# Patient Record
Sex: FEMALE | Race: WHITE | NOT HISPANIC OR LATINO | Employment: FULL TIME | URBAN - METROPOLITAN AREA
[De-identification: names, ages, dates, MRNs, and addresses within clinical notes are randomized per-mention and may not be internally consistent; named-entity substitution may affect disease eponyms.]

---

## 2023-10-29 PROBLEM — N12 PYELONEPHRITIS: Status: ACTIVE | Noted: 2021-06-18

## 2023-10-29 PROBLEM — R58 ECCHYMOSIS: Status: ACTIVE | Noted: 2020-03-27

## 2023-10-29 PROBLEM — F80.81 STUTTER: Status: ACTIVE | Noted: 2019-04-20

## 2023-10-29 PROBLEM — R13.10 DYSPHAGIA: Status: ACTIVE | Noted: 2023-10-29

## 2023-10-29 PROBLEM — R31.9 HEMATURIA: Status: ACTIVE | Noted: 2021-10-07

## 2023-10-29 PROBLEM — R30.0 BURNING WITH URINATION: Status: ACTIVE | Noted: 2021-10-25

## 2023-10-29 PROBLEM — R41.3 MEMORY LOSS: Status: ACTIVE | Noted: 2019-04-22

## 2023-10-29 PROBLEM — L50.9 URTICARIA: Status: ACTIVE | Noted: 2020-06-11

## 2023-10-29 PROBLEM — A41.9 SEPSIS (MULTI): Status: ACTIVE | Noted: 2023-10-29

## 2023-10-29 PROBLEM — R93.89 ABNORMAL MRI: Status: ACTIVE | Noted: 2019-06-07

## 2023-10-29 PROBLEM — Z20.822 EXPOSURE TO COVID-19 VIRUS: Status: ACTIVE | Noted: 2020-11-17

## 2023-10-29 PROBLEM — G56.01 CARPAL TUNNEL SYNDROME OF RIGHT WRIST: Status: ACTIVE | Noted: 2023-10-29

## 2023-10-29 PROBLEM — G89.29 CHRONIC BACK PAIN: Status: ACTIVE | Noted: 2019-06-07

## 2023-10-29 PROBLEM — M32.9 LUPUS (SYSTEMIC LUPUS ERYTHEMATOSUS) (MULTI): Status: ACTIVE | Noted: 2023-10-29

## 2023-10-29 PROBLEM — M54.9 CHRONIC BACK PAIN: Status: ACTIVE | Noted: 2019-06-07

## 2023-10-29 PROBLEM — R90.89 ABNORMAL BRAIN MRI: Status: ACTIVE | Noted: 2023-10-29

## 2023-10-29 PROBLEM — B02.9 HERPES ZOSTER: Status: ACTIVE | Noted: 2019-07-30

## 2023-10-29 PROBLEM — R06.02 SHORTNESS OF BREATH: Status: ACTIVE | Noted: 2020-11-17

## 2023-10-29 PROBLEM — R29.898 RIGHT LEG WEAKNESS: Status: ACTIVE | Noted: 2022-06-20

## 2023-10-29 PROBLEM — G62.9 POLYNEURITIS: Status: ACTIVE | Noted: 2021-06-25

## 2023-10-29 PROBLEM — N13.30 HYDRONEPHROSIS: Status: ACTIVE | Noted: 2021-06-25

## 2023-10-29 PROBLEM — K21.9 ESOPHAGEAL REFLUX: Status: ACTIVE | Noted: 2022-12-09

## 2023-10-29 PROBLEM — F32.A DEPRESSION: Status: ACTIVE | Noted: 2018-10-05

## 2023-10-29 PROBLEM — R31.0 FRANK HEMATURIA: Status: ACTIVE | Noted: 2023-10-29

## 2023-10-29 RX ORDER — HYDROXYCHLOROQUINE SULFATE 200 MG/1
1 TABLET, FILM COATED ORAL 2 TIMES DAILY
COMMUNITY
Start: 2018-10-05

## 2023-10-29 RX ORDER — METHOTREXATE 2.5 MG/1
TABLET ORAL
COMMUNITY
Start: 2019-06-07 | End: 2023-10-30 | Stop reason: WASHOUT

## 2023-10-29 RX ORDER — MAGNESIUM HYDROXIDE 400 MG/5ML
SUSPENSION, ORAL (FINAL DOSE FORM) ORAL
COMMUNITY
Start: 2022-08-15 | End: 2023-10-30 | Stop reason: WASHOUT

## 2023-10-29 RX ORDER — BUPROPION HYDROCHLORIDE 300 MG/1
300 TABLET ORAL DAILY
COMMUNITY
Start: 2022-08-05 | End: 2023-11-09

## 2023-10-29 RX ORDER — IBUPROFEN 100 MG/5ML
SUSPENSION, ORAL (FINAL DOSE FORM) ORAL
COMMUNITY
Start: 2022-08-15 | End: 2023-10-30 | Stop reason: WASHOUT

## 2023-10-29 RX ORDER — GINSENG 100 MG
CAPSULE ORAL
COMMUNITY
Start: 2022-08-15 | End: 2023-10-30 | Stop reason: WASHOUT

## 2023-10-29 RX ORDER — CELECOXIB 200 MG/1
1 CAPSULE ORAL 2 TIMES DAILY
COMMUNITY
Start: 2021-12-22 | End: 2023-10-30 | Stop reason: WASHOUT

## 2023-10-29 RX ORDER — PANTOPRAZOLE SODIUM 40 MG/1
40 TABLET, DELAYED RELEASE ORAL DAILY
COMMUNITY
Start: 2023-03-26 | End: 2023-10-30 | Stop reason: WASHOUT

## 2023-10-29 RX ORDER — MULTIVITAMIN WITH IRON
TABLET ORAL
COMMUNITY
Start: 2022-08-15

## 2023-10-29 RX ORDER — CYCLOBENZAPRINE HCL 10 MG
10 TABLET ORAL 3 TIMES DAILY PRN
COMMUNITY
Start: 2020-06-11 | End: 2023-10-30 | Stop reason: WASHOUT

## 2023-10-29 RX ORDER — NAPROXEN SODIUM 220 MG/1
TABLET, FILM COATED ORAL
COMMUNITY
Start: 2022-08-15

## 2023-10-29 RX ORDER — FOLIC ACID 1 MG/1
1 TABLET ORAL DAILY
COMMUNITY
Start: 2020-06-11 | End: 2023-10-30 | Stop reason: WASHOUT

## 2023-10-29 RX ORDER — ATORVASTATIN CALCIUM 20 MG/1
20 TABLET, FILM COATED ORAL NIGHTLY
COMMUNITY
End: 2023-10-30 | Stop reason: WASHOUT

## 2023-10-29 RX ORDER — IMMUNE GLOBULIN INFUSION (HUMAN) 100 MG/ML
INJECTION, SOLUTION INTRAVENOUS; SUBCUTANEOUS
COMMUNITY
Start: 2022-08-15 | End: 2023-10-30 | Stop reason: WASHOUT

## 2023-10-29 RX ORDER — ESOMEPRAZOLE MAGNESIUM 40 MG/1
1 CAPSULE, DELAYED RELEASE ORAL DAILY
COMMUNITY
Start: 2021-12-22 | End: 2023-10-30 | Stop reason: WASHOUT

## 2023-10-29 RX ORDER — BUPROPION HYDROCHLORIDE 150 MG/1
150 TABLET, EXTENDED RELEASE ORAL DAILY
COMMUNITY
End: 2023-10-30 | Stop reason: WASHOUT

## 2023-10-29 RX ORDER — ALBUTEROL SULFATE 90 UG/1
2 AEROSOL, METERED RESPIRATORY (INHALATION) 4 TIMES DAILY PRN
COMMUNITY
Start: 2020-11-17 | End: 2023-10-30 | Stop reason: WASHOUT

## 2023-10-30 ENCOUNTER — OFFICE VISIT (OUTPATIENT)
Dept: ENDOCRINOLOGY | Facility: CLINIC | Age: 58
End: 2023-10-30
Payer: COMMERCIAL

## 2023-10-30 VITALS
WEIGHT: 204 LBS | DIASTOLIC BLOOD PRESSURE: 79 MMHG | SYSTOLIC BLOOD PRESSURE: 155 MMHG | HEART RATE: 87 BPM | BODY MASS INDEX: 35.02 KG/M2

## 2023-10-30 DIAGNOSIS — E21.3 HYPERPARATHYROIDISM, UNSPECIFIED (MULTI): Primary | ICD-10-CM

## 2023-10-30 PROCEDURE — 99204 OFFICE O/P NEW MOD 45 MIN: CPT | Performed by: INTERNAL MEDICINE

## 2023-10-30 RX ORDER — VIT C/E/ZN/COPPR/LUTEIN/ZEAXAN 250MG-90MG
50 CAPSULE ORAL DAILY
COMMUNITY

## 2023-10-30 ASSESSMENT — ENCOUNTER SYMPTOMS
MYALGIAS: 1
TROUBLE SWALLOWING: 1
NUMBNESS: 1
DIARRHEA: 0
CONSTIPATION: 0
NAUSEA: 0
SHORTNESS OF BREATH: 0
NECK PAIN: 1
VOMITING: 0
UNEXPECTED WEIGHT CHANGE: 0
FATIGUE: 1
ARTHRALGIAS: 1
SORE THROAT: 1
ENDOCRINE COMMENTS: AS ABOVE
WEAKNESS: 1
NERVOUS/ANXIOUS: 1
ABDOMINAL PAIN: 0
DIFFICULTY URINATING: 0

## 2023-10-30 NOTE — LETTER
2023     Aliza Gibson PA-C  [address] [place] [address] [facility]  [address]  [place] [address]    Patient: Eugenie Jung   YOB: 1965   Date of Visit: 10/30/2023       Dear Dr. Aliza Gibson PA-C:    Thank you for referring Eugenie Jung to me for evaluation. Below are my notes for this consultation.  If you have questions, please do not hesitate to call me. I look forward to following your patient along with you.       Sincerely,     Jayce Villatoro MD      CC: Elodia Herrera MD  ______________________________________________________________________________________    History Of Present Illness  Eugenie Jung is a 58 y.o. female with hyperparathyroidism    PTH    No history of hypercalcemia    Vitamin D supplement 2000 international units  per day    History of fractures in her 20s, left distal ulna, left ankle    DEXA scan done 2017  No history of osteoporosis  No history of kidney stone    Treated for SLE  Stopped Simponi infusions and felt much better    Past Medical History  She has a past medical history of Antiphospholipid antibody syndrome (CMS/HCC), Personal history of diseases of the blood and blood-forming organs and certain disorders involving the immune mechanism, and Systemic lupus erythematosus, unspecified (CMS/HCC) (08/15/2022).    Surgical History  She has a past surgical history that includes Other surgical history (08/15/2022); Other surgical history (08/15/2022); Other surgical history (08/15/2022); Other surgical history (2022); Other surgical history (2022);  section, classic; and Knee surgery.     Social History  She has no history on file for tobacco use, alcohol use, and drug use.    Family History  Family History   Problem Relation Name Age of Onset   • Hyperlipidemia Mother     • Hypertension Mother     • Epilepsy Father     • Dementia Father     • Hypertension Father     • Hyperlipidemia Father     • Hodgkin's  lymphoma Sister     • Multiple sclerosis Father's Sister     • Breast cancer Father's Sister     • Leukemia Father's Sister     • Diabetes Maternal Grandmother     • Other (absence seizure) Son         Allergies  Latex, natural rubber; Morphine; Cortisone; and Sulfamethoxazole    Review of Systems   Constitutional:  Positive for fatigue. Negative for unexpected weight change.   HENT:  Positive for sore throat and trouble swallowing.    Eyes:  Negative for visual disturbance.   Respiratory:  Negative for shortness of breath.    Cardiovascular:  Positive for chest pain.   Gastrointestinal:  Negative for abdominal pain, constipation, diarrhea, nausea and vomiting.   Endocrine:        As above   Genitourinary:  Negative for difficulty urinating.   Musculoskeletal:  Positive for arthralgias, myalgias and neck pain.   Neurological:  Positive for weakness and numbness.   Psychiatric/Behavioral:  The patient is nervous/anxious.         Memory loss         Last Recorded Vitals  There were no vitals taken for this visit.    Physical Exam  Constitutional:       General: She is not in acute distress.  HENT:      Head: Normocephalic.   Eyes:      Extraocular Movements: Extraocular movements intact.   Neck:      Thyroid: No thyromegaly.   Cardiovascular:      Pulses:           Radial pulses are 2+ on the right side and 2+ on the left side.   Musculoskeletal:      Right lower leg: No edema.      Left lower leg: No edema.   Lymphadenopathy:      Cervical: No cervical adenopathy.   Neurological:      Mental Status: She is alert.      Motor: No tremor.   Psychiatric:         Mood and Affect: Affect normal.          Relevant Results    DEXA (9/9/17)  BMD of lumbar spine is in osteopenic range with T-score of -1.4.  BMD of the total hip is in osteopenic range with T score of -1.6.  BMD of the femoral neck is in osteopenic range with T-score of -1.2 .     IMPRESSION  HYPERPARATHYROIDISM, UNSPECIFIED  History of PTH elevation   No  history of hypercalcemia  No history of kidney stones  History of osteopenia  Discussed appropriate PTH elevation in the setting of hypocalcemia, vitamin D deficiency or renal insufficiency  Discussed primary hyperparathyroidism if hypercalemic.  Advised primary hyperparathyroidism is most commonly due to a benign tumor of one of four glands, less commonly due to multi-gland hyperplasia.       RECOMMENDATIONS  Draw labs  Call/message if you have not received results in 1 week    Follow up to be determined,

## 2023-10-30 NOTE — PATIENT INSTRUCTIONS
RECOMMENDATIONS  Draw labs  Call/message if you have not received results in 1 week    Follow up to be determined.

## 2023-10-30 NOTE — PROGRESS NOTES
History Of Present Illness  Eugenie Jung is a 58 y.o. female with hyperparathyroidism    PTH    No history of hypercalcemia    Vitamin D supplement 2000 international units  per day    History of fractures in her 20s, left distal ulna, left ankle    DEXA scan done 2017  No history of osteoporosis  No history of kidney stone    Treated for SLE  Stopped Simponi infusions and felt much better    Past Medical History  She has a past medical history of Antiphospholipid antibody syndrome (CMS/HCC), Personal history of diseases of the blood and blood-forming organs and certain disorders involving the immune mechanism, and Systemic lupus erythematosus, unspecified (CMS/HCC) (08/15/2022).    Surgical History  She has a past surgical history that includes Other surgical history (08/15/2022); Other surgical history (08/15/2022); Other surgical history (08/15/2022); Other surgical history (2022); Other surgical history (2022);  section, classic; and Knee surgery.     Social History  She has no history on file for tobacco use, alcohol use, and drug use.    Family History  Family History   Problem Relation Name Age of Onset    Hyperlipidemia Mother      Hypertension Mother      Epilepsy Father      Dementia Father      Hypertension Father      Hyperlipidemia Father      Hodgkin's lymphoma Sister      Multiple sclerosis Father's Sister      Breast cancer Father's Sister      Leukemia Father's Sister      Diabetes Maternal Grandmother      Other (absence seizure) Son         Allergies  Latex, natural rubber; Morphine; Cortisone; and Sulfamethoxazole    Review of Systems   Constitutional:  Positive for fatigue. Negative for unexpected weight change.   HENT:  Positive for sore throat and trouble swallowing.    Eyes:  Negative for visual disturbance.   Respiratory:  Negative for shortness of breath.    Cardiovascular:  Positive for chest pain.   Gastrointestinal:  Negative for abdominal pain,  constipation, diarrhea, nausea and vomiting.   Endocrine:        As above   Genitourinary:  Negative for difficulty urinating.   Musculoskeletal:  Positive for arthralgias, myalgias and neck pain.   Neurological:  Positive for weakness and numbness.   Psychiatric/Behavioral:  The patient is nervous/anxious.         Memory loss         Last Recorded Vitals  There were no vitals taken for this visit.    Physical Exam  Constitutional:       General: She is not in acute distress.  HENT:      Head: Normocephalic.   Eyes:      Extraocular Movements: Extraocular movements intact.   Neck:      Thyroid: No thyromegaly.   Cardiovascular:      Pulses:           Radial pulses are 2+ on the right side and 2+ on the left side.   Musculoskeletal:      Right lower leg: No edema.      Left lower leg: No edema.   Lymphadenopathy:      Cervical: No cervical adenopathy.   Neurological:      Mental Status: She is alert.      Motor: No tremor.   Psychiatric:         Mood and Affect: Affect normal.          Relevant Results    DEXA (9/9/17)  BMD of lumbar spine is in osteopenic range with T-score of -1.4.  BMD of the total hip is in osteopenic range with T score of -1.6.  BMD of the femoral neck is in osteopenic range with T-score of -1.2 .     IMPRESSION  HYPERPARATHYROIDISM, UNSPECIFIED  History of PTH elevation   No history of hypercalcemia  No history of kidney stones  History of osteopenia  Discussed appropriate PTH elevation in the setting of hypocalcemia, vitamin D deficiency or renal insufficiency  Discussed primary hyperparathyroidism if hypercalemic.  Advised primary hyperparathyroidism is most commonly due to a benign tumor of one of four glands, less commonly due to multi-gland hyperplasia.       RECOMMENDATIONS  Draw labs  Call/message if you have not received results in 1 week    Follow up to be determined,

## 2023-11-03 ENCOUNTER — LAB (OUTPATIENT)
Dept: LAB | Facility: LAB | Age: 58
End: 2023-11-03
Payer: COMMERCIAL

## 2023-11-03 DIAGNOSIS — E21.3 HYPERPARATHYROIDISM, UNSPECIFIED (MULTI): ICD-10-CM

## 2023-11-03 LAB
25(OH)D3 SERPL-MCNC: 35 NG/ML (ref 30–100)
ALBUMIN SERPL BCP-MCNC: 4.2 G/DL (ref 3.4–5)
ALP SERPL-CCNC: 159 U/L (ref 33–110)
ALT SERPL W P-5'-P-CCNC: 31 U/L (ref 7–45)
ANION GAP SERPL CALC-SCNC: 13 MMOL/L (ref 10–20)
AST SERPL W P-5'-P-CCNC: 31 U/L (ref 9–39)
BILIRUB SERPL-MCNC: 0.3 MG/DL (ref 0–1.2)
BUN SERPL-MCNC: 19 MG/DL (ref 6–23)
CA-I BLD-SCNC: 1.21 MMOL/L (ref 1.1–1.33)
CALCIUM SERPL-MCNC: 9.9 MG/DL (ref 8.6–10.6)
CHLORIDE SERPL-SCNC: 104 MMOL/L (ref 98–107)
CO2 SERPL-SCNC: 29 MMOL/L (ref 21–32)
CREAT SERPL-MCNC: 1.03 MG/DL (ref 0.5–1.05)
GFR SERPL CREATININE-BSD FRML MDRD: 64 ML/MIN/1.73M*2
GLUCOSE SERPL-MCNC: 79 MG/DL (ref 74–99)
MAGNESIUM SERPL-MCNC: 2.42 MG/DL (ref 1.6–2.4)
PHOSPHATE SERPL-MCNC: 4.6 MG/DL (ref 2.5–4.9)
POTASSIUM SERPL-SCNC: 4.5 MMOL/L (ref 3.5–5.3)
PROT SERPL-MCNC: 7.8 G/DL (ref 6.4–8.2)
PTH-INTACT SERPL-MCNC: 133.2 PG/ML (ref 18.5–88)
SODIUM SERPL-SCNC: 141 MMOL/L (ref 136–145)

## 2023-11-03 PROCEDURE — 80053 COMPREHEN METABOLIC PANEL: CPT

## 2023-11-03 PROCEDURE — 83735 ASSAY OF MAGNESIUM: CPT

## 2023-11-03 PROCEDURE — 84100 ASSAY OF PHOSPHORUS: CPT

## 2023-11-03 PROCEDURE — 83970 ASSAY OF PARATHORMONE: CPT

## 2023-11-03 PROCEDURE — 82330 ASSAY OF CALCIUM: CPT

## 2023-11-03 PROCEDURE — 82306 VITAMIN D 25 HYDROXY: CPT

## 2023-11-03 PROCEDURE — 36415 COLL VENOUS BLD VENIPUNCTURE: CPT

## 2024-01-08 ENCOUNTER — APPOINTMENT (OUTPATIENT)
Dept: RADIOLOGY | Facility: HOSPITAL | Age: 59
End: 2024-01-08
Payer: COMMERCIAL

## 2024-01-08 ENCOUNTER — APPOINTMENT (OUTPATIENT)
Dept: CARDIOLOGY | Facility: HOSPITAL | Age: 59
End: 2024-01-08
Payer: COMMERCIAL

## 2024-01-08 ENCOUNTER — HOSPITAL ENCOUNTER (EMERGENCY)
Facility: HOSPITAL | Age: 59
Discharge: HOME | End: 2024-01-09
Attending: EMERGENCY MEDICINE
Payer: COMMERCIAL

## 2024-01-08 VITALS
SYSTOLIC BLOOD PRESSURE: 129 MMHG | DIASTOLIC BLOOD PRESSURE: 66 MMHG | OXYGEN SATURATION: 96 % | HEART RATE: 134 BPM | HEIGHT: 65 IN | TEMPERATURE: 102.9 F | BODY MASS INDEX: 30.82 KG/M2 | RESPIRATION RATE: 18 BRPM | WEIGHT: 185 LBS

## 2024-01-08 DIAGNOSIS — B34.9 VIRAL SYNDROME: ICD-10-CM

## 2024-01-08 DIAGNOSIS — U07.1 COVID-19: Primary | ICD-10-CM

## 2024-01-08 LAB
BASOPHILS # BLD AUTO: 0.06 X10*3/UL (ref 0–0.1)
BASOPHILS NFR BLD AUTO: 0.6 %
EOSINOPHIL # BLD AUTO: 0.03 X10*3/UL (ref 0–0.7)
EOSINOPHIL NFR BLD AUTO: 0.3 %
ERYTHROCYTE [DISTWIDTH] IN BLOOD BY AUTOMATED COUNT: 13.2 % (ref 11.5–14.5)
FLUAV RNA RESP QL NAA+PROBE: NOT DETECTED
FLUBV RNA RESP QL NAA+PROBE: NOT DETECTED
HCT VFR BLD AUTO: 43 % (ref 36–46)
HGB BLD-MCNC: 14.4 G/DL (ref 12–16)
IMM GRANULOCYTES # BLD AUTO: 0.03 X10*3/UL (ref 0–0.7)
IMM GRANULOCYTES NFR BLD AUTO: 0.3 % (ref 0–0.9)
LYMPHOCYTES # BLD AUTO: 1.02 X10*3/UL (ref 1.2–4.8)
LYMPHOCYTES NFR BLD AUTO: 10.8 %
MCH RBC QN AUTO: 29.9 PG (ref 26–34)
MCHC RBC AUTO-ENTMCNC: 33.5 G/DL (ref 32–36)
MCV RBC AUTO: 89 FL (ref 80–100)
MONOCYTES # BLD AUTO: 0.7 X10*3/UL (ref 0.1–1)
MONOCYTES NFR BLD AUTO: 7.4 %
NEUTROPHILS # BLD AUTO: 7.57 X10*3/UL (ref 1.2–7.7)
NEUTROPHILS NFR BLD AUTO: 80.6 %
NRBC BLD-RTO: 0 /100 WBCS (ref 0–0)
NT-PROBNP SERPL-MCNC: 109 PG/ML (ref 0–226)
PLATELET # BLD AUTO: 275 X10*3/UL (ref 150–450)
RBC # BLD AUTO: 4.82 X10*6/UL (ref 4–5.2)
RSV RNA RESP QL NAA+PROBE: NOT DETECTED
SARS-COV-2 RNA RESP QL NAA+PROBE: DETECTED
WBC # BLD AUTO: 9.4 X10*3/UL (ref 4.4–11.3)

## 2024-01-08 PROCEDURE — 84484 ASSAY OF TROPONIN QUANT: CPT | Performed by: PHYSICIAN ASSISTANT

## 2024-01-08 PROCEDURE — 87637 SARSCOV2&INF A&B&RSV AMP PRB: CPT | Performed by: PHYSICIAN ASSISTANT

## 2024-01-08 PROCEDURE — 36415 COLL VENOUS BLD VENIPUNCTURE: CPT | Performed by: PHYSICIAN ASSISTANT

## 2024-01-08 PROCEDURE — 99284 EMERGENCY DEPT VISIT MOD MDM: CPT | Performed by: EMERGENCY MEDICINE

## 2024-01-08 PROCEDURE — 85025 COMPLETE CBC W/AUTO DIFF WBC: CPT | Performed by: PHYSICIAN ASSISTANT

## 2024-01-08 PROCEDURE — 2500000004 HC RX 250 GENERAL PHARMACY W/ HCPCS (ALT 636 FOR OP/ED): Performed by: NURSE PRACTITIONER

## 2024-01-08 PROCEDURE — 93005 ELECTROCARDIOGRAM TRACING: CPT

## 2024-01-08 PROCEDURE — 80053 COMPREHEN METABOLIC PANEL: CPT | Performed by: PHYSICIAN ASSISTANT

## 2024-01-08 PROCEDURE — 83880 ASSAY OF NATRIURETIC PEPTIDE: CPT | Performed by: PHYSICIAN ASSISTANT

## 2024-01-08 PROCEDURE — 96361 HYDRATE IV INFUSION ADD-ON: CPT

## 2024-01-08 PROCEDURE — 71046 X-RAY EXAM CHEST 2 VIEWS: CPT

## 2024-01-08 RX ORDER — ACETAMINOPHEN 325 MG/1
975 TABLET ORAL ONCE
Status: COMPLETED | OUTPATIENT
Start: 2024-01-08 | End: 2024-01-08

## 2024-01-08 RX ADMIN — SODIUM CHLORIDE 1000 ML: 9 INJECTION, SOLUTION INTRAVENOUS at 17:25

## 2024-01-08 RX ADMIN — ACETAMINOPHEN 975 MG: 325 TABLET ORAL at 16:10

## 2024-01-08 ASSESSMENT — PAIN - FUNCTIONAL ASSESSMENT: PAIN_FUNCTIONAL_ASSESSMENT: 0-10

## 2024-01-08 ASSESSMENT — PAIN DESCRIPTION - PROGRESSION: CLINICAL_PROGRESSION: NOT CHANGED

## 2024-01-08 ASSESSMENT — COLUMBIA-SUICIDE SEVERITY RATING SCALE - C-SSRS
1. IN THE PAST MONTH, HAVE YOU WISHED YOU WERE DEAD OR WISHED YOU COULD GO TO SLEEP AND NOT WAKE UP?: NO
2. HAVE YOU ACTUALLY HAD ANY THOUGHTS OF KILLING YOURSELF?: NO

## 2024-01-08 ASSESSMENT — PAIN DESCRIPTION - FREQUENCY: FREQUENCY: INTERMITTENT

## 2024-01-08 ASSESSMENT — PAIN DESCRIPTION - DESCRIPTORS: DESCRIPTORS: TIGHTNESS

## 2024-01-08 ASSESSMENT — PAIN SCALES - GENERAL: PAINLEVEL_OUTOF10: 7

## 2024-01-08 ASSESSMENT — PAIN DESCRIPTION - ORIENTATION: ORIENTATION: RIGHT

## 2024-01-08 ASSESSMENT — PAIN DESCRIPTION - LOCATION: LOCATION: CHEST

## 2024-01-08 ASSESSMENT — PAIN DESCRIPTION - PAIN TYPE: TYPE: ACUTE PAIN

## 2024-01-08 ASSESSMENT — PAIN DESCRIPTION - ONSET: ONSET: SUDDEN

## 2024-01-08 NOTE — ED PROVIDER NOTES
HPI   Chief Complaint   Patient presents with    Fever     I feel sob and chest tightness with a fever and weak and a throbbing h/a       HPI  Please see my MDM                  [place] Coma Scale Score: 15                  Patient History   Past Medical History:   Diagnosis Date    Antiphospholipid antibody syndrome (CMS/HCC)     Personal history of diseases of the blood and blood-forming organs and certain disorders involving the immune mechanism     History of autoimmune disorder    Systemic lupus erythematosus, unspecified (CMS/HCC) 08/15/2022    Lupus     Past Surgical History:   Procedure Laterality Date     SECTION, CLASSIC      KNEE SURGERY      OTHER SURGICAL HISTORY  08/15/2022    Hysterectomy    OTHER SURGICAL HISTORY  08/15/2022    Carpal tunnel surgery    OTHER SURGICAL HISTORY  08/15/2022    Shoulder surgery    OTHER SURGICAL HISTORY  2022    Appendectomy    OTHER SURGICAL HISTORY  2022    Cholecystectomy     Family History   Problem Relation Name Age of Onset    Hyperlipidemia Mother      Hypertension Mother      Epilepsy Father      Dementia Father      Hypertension Father      Hyperlipidemia Father      Hodgkin's lymphoma Sister      Multiple sclerosis Father's Sister      Breast cancer Father's Sister      Leukemia Father's Sister      Diabetes Maternal Grandmother      Other (absence seizure) Son       Social History     Tobacco Use    Smoking status: Not on file    Smokeless tobacco: Not on file   Substance Use Topics    Alcohol use: Not on file    Drug use: Not on file       Physical Exam   ED Triage Vitals [24 1614]   Temp Heart Rate Resp BP   39.4 °C (102.9 °F) (!) 134 18 129/66      SpO2 Temp Source Heart Rate Source Patient Position   96 % Oral Monitor Sitting      BP Location FiO2 (%)     Left arm --       Physical Exam  CONSTITUTIONAL: Vital signs reviewed as charted, well-developed and in no distress  Eyes: Extraocular muscles are intact. Pupils equal round and  reactive to light. Conjunctiva are pink.    ENT: Mucous membranes are moist. Tongue in the midline. Pharynx was without erythema or exudates, uvula midline  LUNGS: Breath sounds equal and clear to auscultation. Good air exchange, no wheezes rales or retractions, pulse oximetry is charted.  HEART: Regular rate and rhythm without murmur thrill or rub, strong tones, auscultation is normal.  ABDOMEN: Soft and nontender without guarding rebound rigidity or mass. Bowel sounds are present and normal in all quadrants. There is no palpable masses or aneurysms identified. No hepatosplenomegaly, normal abdominal exam.  Neuro: The patient is awake, alert and oriented ×3. Moving all 4 extremities and answering questions appropriately.   MUSCULOSKELETAL: The calves are nontender to palpation. Full gross active range of motion.   PSYCH: Awake alert oriented, normal mood and affect.  Skin:  Dry, normal color, warm to the touch, no rash present.      ED Course & MDM   ED Course as of 01/11/24 0856   Mon Jan 08, 2024   1623 EKG: Normal sinus rhythm, tachycardia.  Normal QRS.  Nonspecific ST abnormalities.  No STEMI.  Interpreted by me. [FR]   Tue Jan 09, 2024   0034 Care transferred to Dr. Perez pending reassessment  [RJ]   0133 I did speak with the patient, and she is aware of the fact that she does have coronavirus and she has received IV fluids.  Patient will be given Toradol to help with her pain.  I do feel the patient can be safely discharged home and I will prescribe medications to help with her symptoms.  Patient will be started on Paxlovid as well. [FR]      ED Course User Index  [FR] Peter Perez DO  [RJ] TORSTEN Vazquez-CNP         Diagnoses as of 01/11/24 0856   COVID-19   Viral syndrome       Medical Decision Making  History obtained from: patient    Vital signs, nursing notes, current medications, past medical history, Surgical history, allergies, social history, family History were reviewed.          HPI:  Patient is a 58-year-old female presents complaining of several days of chest discomfort and exertional shortness of breath.  She also has had a cough and generally not feeling well.  She denies any breathing problems, denies cardiac history.  Denies dizziness, or abdominal pain or lower extremity edema.  She is known to be febrile and tachycardic on arrival.      10 point ROS was reviewed and negative except Noted above in HPI.  DDX: as listed above    Chest x-ray interpreted by the radiologist showed:  Impression:    No acute cardiopulmonary process.        Medications administered during this visit (name and route): IV normal saline, oral acetaminophen  EKG interpretted by my attending physician    [facility] Summary/considerations:  I estimate there is LOW risk for EPIGLOTTITIS, PNEUMONIA, MENINGITIS, OR URINARY TRACT INFECTION, thus I consider the discharge disposition reasonable. Also, there is no evidence for peritonitis, sepsis, or toxicity. We have discussed the diagnosis and risks, and we agree with discharging home to follow-up with their primary doctor. We also discussed returning to the Emergency Department immediately if new or worsening symptoms occur. We have discussed the symptoms which are most concerning (e.g., changing or worsening pain, trouble swallowing or breathing, neck stiffness, fever) that necessitate immediate return.    Patient was positive for COVID-19, was given IV fluids, symptomatic control and feeling better.  Tachycardia had resolved heart rate at time of discharge was 89.  Patient was discharged home in stable condition will follow PCP 1 to 2 days for reevaluation.        All of the patient's questions were answered to the best of my ability.  Patient states understanding that they have been screened for an emergency today and we have not found any etiology of symptoms that requires emergent treatment or admission to the hospital at this point. They understand that they  have not had definitive care day and require follow-up for treatment of their condition. They also state understanding that they may have an emergent condition that may potentially have not of detected at this visit and they must return to the emergency department if they develop any worsening of symptoms or new complaints.      Critical Care: Not warranted at this time    Prescriptions provided include: ###    This chart was completed using voice recognition transcription software. Please excuse any errors of transcription including grammatical, punctuation, syntax and spelling errors.  Please contact me with any questions regarding this chart.    Procedure  Procedures     TORSTEN Vazquez-CHRISTINE  01/11/24 0856

## 2024-01-09 LAB
ALBUMIN SERPL-MCNC: 3.7 G/DL (ref 3.5–5)
ALP BLD-CCNC: 115 U/L (ref 35–125)
ALT SERPL-CCNC: 26 U/L (ref 5–40)
ANION GAP SERPL CALC-SCNC: 11 MMOL/L
AST SERPL-CCNC: 22 U/L (ref 5–40)
BILIRUB SERPL-MCNC: 0.2 MG/DL (ref 0.1–1.2)
BUN SERPL-MCNC: 10 MG/DL (ref 8–25)
CALCIUM SERPL-MCNC: 8.7 MG/DL (ref 8.5–10.4)
CHLORIDE SERPL-SCNC: 105 MMOL/L (ref 97–107)
CO2 SERPL-SCNC: 20 MMOL/L (ref 24–31)
CREAT SERPL-MCNC: 0.9 MG/DL (ref 0.4–1.6)
EGFRCR SERPLBLD CKD-EPI 2021: 75 ML/MIN/1.73M*2
GLUCOSE SERPL-MCNC: 117 MG/DL (ref 65–99)
POTASSIUM SERPL-SCNC: 3.9 MMOL/L (ref 3.4–5.1)
PROT SERPL-MCNC: 7 G/DL (ref 5.9–7.9)
SODIUM SERPL-SCNC: 136 MMOL/L (ref 133–145)
TROPONIN T SERPL-MCNC: 9 NG/L
TROPONIN T SERPL-MCNC: 9 NG/L

## 2024-01-09 PROCEDURE — 96374 THER/PROPH/DIAG INJ IV PUSH: CPT

## 2024-01-09 PROCEDURE — 2500000004 HC RX 250 GENERAL PHARMACY W/ HCPCS (ALT 636 FOR OP/ED): Performed by: EMERGENCY MEDICINE

## 2024-01-09 RX ORDER — ONDANSETRON 4 MG/1
4 TABLET, ORALLY DISINTEGRATING ORAL EVERY 8 HOURS PRN
Qty: 20 TABLET | Refills: 0 | Status: SHIPPED | OUTPATIENT
Start: 2024-01-09 | End: 2024-01-16

## 2024-01-09 RX ORDER — KETOROLAC TROMETHAMINE 30 MG/ML
15 INJECTION, SOLUTION INTRAMUSCULAR; INTRAVENOUS ONCE
Status: COMPLETED | OUTPATIENT
Start: 2024-01-09 | End: 2024-01-09

## 2024-01-09 RX ORDER — IBUPROFEN 600 MG/1
600 TABLET ORAL EVERY 6 HOURS PRN
Qty: 20 TABLET | Refills: 0 | Status: SHIPPED | OUTPATIENT
Start: 2024-01-09 | End: 2024-01-16

## 2024-01-09 RX ORDER — BROMPHENIRAMINE MALEATE, PSEUDOEPHEDRINE HYDROCHLORIDE, AND DEXTROMETHORPHAN HYDROBROMIDE 2; 30; 10 MG/5ML; MG/5ML; MG/5ML
10 SYRUP ORAL 4 TIMES DAILY PRN
Qty: 250 ML | Refills: 0 | Status: SHIPPED | OUTPATIENT
Start: 2024-01-09 | End: 2024-01-19

## 2024-01-09 RX ADMIN — KETOROLAC TROMETHAMINE 15 MG: 30 INJECTION INTRAMUSCULAR; INTRAVENOUS at 01:56

## 2024-01-09 ASSESSMENT — PAIN SCALES - GENERAL: PAINLEVEL_OUTOF10: 7

## 2024-01-09 NOTE — ED PROVIDER NOTES
HPI   Chief Complaint   Patient presents with    Fever     I feel sob and chest tightness with a fever and weak and a throbbing h/a       Patient was turned over to me awaiting results of patient's blood work.  The patient was seen here for viral type syndrome, and the patient also felt as though she was dehydrated.  Please refer to the midlevel's initial note.  The patient does have history of having lupus.                          [place] Coma Scale Score: 15                  Patient History   Past Medical History:   Diagnosis Date    Antiphospholipid antibody syndrome (CMS/[facility])     Personal history of diseases of the blood and blood-forming organs and certain disorders involving the immune mechanism     History of autoimmune disorder    Systemic lupus erythematosus, unspecified (CMS/HCC) 08/15/2022    Lupus     Past Surgical History:   Procedure Laterality Date     SECTION, CLASSIC      KNEE SURGERY      OTHER SURGICAL HISTORY  08/15/2022    Hysterectomy    OTHER SURGICAL HISTORY  08/15/2022    Carpal tunnel surgery    OTHER SURGICAL HISTORY  08/15/2022    Shoulder surgery    OTHER SURGICAL HISTORY  2022    Appendectomy    OTHER SURGICAL HISTORY  2022    Cholecystectomy     Family History   Problem Relation Name Age of Onset    Hyperlipidemia Mother      Hypertension Mother      Epilepsy Father      Dementia Father      Hypertension Father      Hyperlipidemia Father      Hodgkin's lymphoma Sister      Multiple sclerosis Father's Sister      Breast cancer Father's Sister      Leukemia Father's Sister      Diabetes Maternal Grandmother      Other (absence seizure) Son       Social History     Tobacco Use    Smoking status: Not on file    Smokeless tobacco: Not on file   Substance Use Topics    Alcohol use: Not on file    Drug use: Not on file       Physical Exam   ED Triage Vitals [24 1614]   Temp Heart Rate Resp BP   39.4 °C (102.9 °F) (!) 134 18 129/66      SpO2 Temp Source Heart Rate  Source Patient Position   96 % Oral Monitor Sitting      BP Location FiO2 (%)     Left arm --       Physical Exam  Vitals and nursing note reviewed.   Constitutional:       General: She is not in acute distress.     Appearance: She is well-developed.   HENT:      Head: Normocephalic and atraumatic.   Eyes:      Conjunctiva/sclera: Conjunctivae normal.   Cardiovascular:      Rate and Rhythm: Normal rate and regular rhythm.      Heart sounds: No murmur heard.  Pulmonary:      Effort: Pulmonary effort is normal. No respiratory distress.      Breath sounds: Normal breath sounds.   Abdominal:      Palpations: Abdomen is soft.      Tenderness: There is no abdominal tenderness.   Musculoskeletal:         General: No swelling.      Cervical back: Neck supple.   Skin:     General: Skin is warm and dry.      Capillary Refill: Capillary refill takes less than 2 seconds.   Neurological:      Mental Status: She is alert.         ED Course & MDM   ED Course as of 01/09/24 0142   Mon Jan 08, 2024   1623 EKG: Normal sinus rhythm, tachycardia.  Normal QRS.  Nonspecific ST abnormalities.  No STEMI.  Interpreted by me. [FR]   Tue Jan 09, 2024   0034 Care transferred to Dr. Perez pending reassessment  [RJ]   0133 I did speak with the patient, and she is aware of the fact that she does have coronavirus and she has received IV fluids.  Patient will be given Toradol to help with her pain.  I do feel the patient can be safely discharged home and I will prescribe medications to help with her symptoms.  Patient will be started on Paxlovid as well. [FR]      ED Course User Index  [FR] Peter Perez DO  [RJ] TORSTEN Vazquez-CNP         Diagnoses as of 01/09/24 0142   COVID-19   Viral syndrome       Medical Decision Making  After our initial evaluation, the patient did have a full workup done including EKG, and blood work which included cardiac enzymes, and also viral studies were sent as well.  Viral studies were noted to be  positive for coronavirus, and I will reevaluate the patient after receiving IV fluids.        Procedure  Procedures     Peter Perez DO  01/09/24 0142

## 2024-01-12 LAB
ATRIAL RATE: 129 BPM
P AXIS: 46 DEGREES
P OFFSET: 193 MS
P ONSET: 143 MS
PR INTERVAL: 150 MS
Q ONSET: 218 MS
QRS COUNT: 21 BEATS
QRS DURATION: 78 MS
QT INTERVAL: 304 MS
QTC CALCULATION(BAZETT): 445 MS
QTC FREDERICIA: 392 MS
R AXIS: -32 DEGREES
T AXIS: 21 DEGREES
T OFFSET: 370 MS
VENTRICULAR RATE: 129 BPM

## 2024-01-16 ENCOUNTER — TELEMEDICINE (OUTPATIENT)
Dept: PRIMARY CARE | Facility: CLINIC | Age: 59
End: 2024-01-16
Payer: COMMERCIAL

## 2024-01-16 VITALS — WEIGHT: 185 LBS | OXYGEN SATURATION: 98 % | TEMPERATURE: 98.7 F | BODY MASS INDEX: 30.79 KG/M2 | HEART RATE: 99 BPM

## 2024-01-16 DIAGNOSIS — M32.8 OTHER FORMS OF SYSTEMIC LUPUS ERYTHEMATOSUS, UNSPECIFIED ORGAN INVOLVEMENT STATUS (MULTI): ICD-10-CM

## 2024-01-16 DIAGNOSIS — E21.3 HYPERPARATHYROIDISM, UNSPECIFIED (MULTI): ICD-10-CM

## 2024-01-16 DIAGNOSIS — U07.1 COVID: Primary | ICD-10-CM

## 2024-01-16 PROCEDURE — 99213 OFFICE O/P EST LOW 20 MIN: CPT | Performed by: PHYSICIAN ASSISTANT

## 2024-01-16 RX ORDER — ALBUTEROL SULFATE 90 UG/1
2 AEROSOL, METERED RESPIRATORY (INHALATION) EVERY 6 HOURS PRN
Qty: 8.5 G | Refills: 1 | Status: SHIPPED | OUTPATIENT
Start: 2024-01-16 | End: 2025-01-15

## 2024-01-16 ASSESSMENT — ENCOUNTER SYMPTOMS
SYNCOPE: 0
SPUTUM PRODUCTION: 1
LEG PAIN: 0
FEVER: 0
PND: 0
RHINORRHEA: 1
VOMITING: 0
SORE THROAT: 1
ORTHOPNEA: 1
ABDOMINAL PAIN: 0
SWOLLEN GLANDS: 0
WHEEZING: 0
NECK PAIN: 0
HEADACHES: 1
HEMOPTYSIS: 0
CLAUDICATION: 0
SHORTNESS OF BREATH: 1

## 2024-01-16 ASSESSMENT — PAIN SCALES - GENERAL: PAINLEVEL: 6

## 2024-01-16 NOTE — PROGRESS NOTES
Answers submitted by the patient for this visit:  Shortness of Breath Questionnaire (Submitted on 1/16/2024)  Chief Complaint: Shortness of breath  Chronicity: recurrent  Onset: in the past 7 days  Frequency: every few minutes  Progression since onset: waxing and waning  Episode duration: 3 Minutes  abdominal pain: No  chest pain: Yes  claudication: No  coryza: Yes  ear pain: No  fever: No  headaches: Yes  hemoptysis: No  leg pain: No  leg swelling: No  neck pain: No  orthopnea: Yes  PND: No  rash: No  rhinorrhea: Yes  sore throat: Yes  sputum production: Yes  swollen glands: No  syncope: No  vomiting: No  wheezing: No  Aggravating factors: any activity

## 2024-01-16 NOTE — PROGRESS NOTES
Subjective   Patient ID: Eugenie Jung is a 58 y.o. female who presents for URI (Tested positive for COVID on 1/8 in ED, given course of paxlovid. She is still having cough and congestion, fatigue, shortness of breath on exertion. Afebrile as of 1/11. Her home O2 has been between 92-96%.).    Shortness of Breath  This is a recurrent problem. The current episode started in the past 7 days. The problem occurs every few minutes. The problem has been waxing and waning. The average episode lasts 3 minutes. Associated symptoms include chest pain, coryza, headaches, orthopnea, rhinorrhea, a sore throat and sputum production. Pertinent negatives include no abdominal pain, claudication, ear pain, fever, hemoptysis, leg pain, leg swelling, neck pain, PND, rash, swollen glands, syncope, vomiting or wheezing. The symptoms are aggravated by any activity.        Review of Systems   Constitutional:  Negative for fever.   HENT:  Positive for rhinorrhea and sore throat. Negative for ear pain.    Respiratory:  Positive for sputum production and shortness of breath. Negative for hemoptysis and wheezing.    Cardiovascular:  Positive for chest pain and orthopnea. Negative for claudication, leg swelling, syncope and PND.   Gastrointestinal:  Negative for abdominal pain and vomiting.   Musculoskeletal:  Negative for neck pain.   Skin:  Negative for rash.   Neurological:  Positive for headaches.       Objective   Pulse 99   Temp 37.1 °C (98.7 °F)   Wt 83.9 kg (185 lb)   SpO2 98%   BMI 30.79 kg/m²     Physical Exam  Constitutional:       General: She is not in acute distress.     Appearance: Normal appearance.   Neurological:      Mental Status: She is alert. Mental status is at baseline.   Psychiatric:         Mood and Affect: Mood normal.         Judgment: Judgment normal.         Assessment/Plan   Diagnoses and all orders for this visit:  COVID  -     albuterol (ProAir HFA) 90 mcg/actuation inhaler; Inhale 2 puffs every 6 hours  if needed for wheezing or shortness of breath.  Hyperparathyroidism, unspecified (CMS/HCC)  Other forms of systemic lupus erythematosus, unspecified organ involvement status (CMS/HCC)    Discussed quarantine time as well.  Patient should follow-up if symptoms persist or worsen.

## 2024-01-16 NOTE — LETTER
Date: 2024  RE:  Eugenie Jung  :  1965      To Whom It May Concern:    Eugenie, has been under my care and now may return back to work without restrictions.    Their return to work date is:  2024.        Sincerely,      Aliza Gbison PA-C

## 2024-02-18 DIAGNOSIS — F32.A DEPRESSION, UNSPECIFIED DEPRESSION TYPE: ICD-10-CM

## 2024-02-18 NOTE — LETTER
February 26, 2024     Eugenie Jung  [address]  [place]  [place] [address]    Patient: Eugenie M Fabiana   YOB: 1965   Date of Visit: 2/18/2024       Dear Eugenie Jung:    We have been trying to reach you to schedule your yearly physical. Please call the office to schedule.        Sincerely,     Aliza Gibson PA-C        ______________________________________________________________________________________

## 2024-02-20 RX ORDER — BUPROPION HYDROCHLORIDE 300 MG/1
TABLET ORAL
Qty: 90 TABLET | Refills: 0 | Status: SHIPPED | OUTPATIENT
Start: 2024-02-20 | End: 2024-03-22 | Stop reason: SDUPTHER

## 2024-03-21 ASSESSMENT — PROMIS GLOBAL HEALTH SCALE
CARRYOUT_PHYSICAL_ACTIVITIES: MODERATELY
RATE_AVERAGE_PAIN: 7
RATE_PHYSICAL_HEALTH: FAIR
RATE_GENERAL_HEALTH: FAIR
RATE_MENTAL_HEALTH: POOR
RATE_AVERAGE_FATIGUE: SEVERE
RATE_QUALITY_OF_LIFE: FAIR
CARRYOUT_SOCIAL_ACTIVITIES: FAIR
RATE_SOCIAL_SATISFACTION: FAIR
EMOTIONAL_PROBLEMS: SOMETIMES

## 2024-03-22 ENCOUNTER — LAB (OUTPATIENT)
Dept: LAB | Facility: LAB | Age: 59
End: 2024-03-22
Payer: COMMERCIAL

## 2024-03-22 ENCOUNTER — OFFICE VISIT (OUTPATIENT)
Dept: PRIMARY CARE | Facility: CLINIC | Age: 59
End: 2024-03-22
Payer: COMMERCIAL

## 2024-03-22 VITALS
DIASTOLIC BLOOD PRESSURE: 96 MMHG | HEART RATE: 81 BPM | WEIGHT: 196 LBS | OXYGEN SATURATION: 97 % | SYSTOLIC BLOOD PRESSURE: 126 MMHG | BODY MASS INDEX: 32.62 KG/M2

## 2024-03-22 DIAGNOSIS — I35.8 AORTIC VALVE SCLEROSIS: ICD-10-CM

## 2024-03-22 DIAGNOSIS — E21.3 HYPERPARATHYROIDISM, UNSPECIFIED (MULTI): ICD-10-CM

## 2024-03-22 DIAGNOSIS — M32.8 OTHER FORMS OF SYSTEMIC LUPUS ERYTHEMATOSUS, UNSPECIFIED ORGAN INVOLVEMENT STATUS (MULTI): ICD-10-CM

## 2024-03-22 DIAGNOSIS — F41.9 ANXIETY: ICD-10-CM

## 2024-03-22 DIAGNOSIS — Z00.00 ROUTINE MEDICAL EXAM: ICD-10-CM

## 2024-03-22 DIAGNOSIS — Z12.11 ENCOUNTER FOR SCREENING FOR MALIGNANT NEOPLASM OF COLON: Primary | ICD-10-CM

## 2024-03-22 DIAGNOSIS — K21.9 GASTROESOPHAGEAL REFLUX DISEASE WITHOUT ESOPHAGITIS: ICD-10-CM

## 2024-03-22 DIAGNOSIS — M85.89 OSTEOPENIA OF MULTIPLE SITES: ICD-10-CM

## 2024-03-22 DIAGNOSIS — F32.A DEPRESSION, UNSPECIFIED DEPRESSION TYPE: ICD-10-CM

## 2024-03-22 DIAGNOSIS — Z12.31 VISIT FOR SCREENING MAMMOGRAM: ICD-10-CM

## 2024-03-22 PROBLEM — L93.0 LUPUS ERYTHEMATOSUS: Status: ACTIVE | Noted: 2023-10-29

## 2024-03-22 PROBLEM — M54.50 LOW BACK PAIN: Status: ACTIVE | Noted: 2019-06-07

## 2024-03-22 LAB
ALBUMIN SERPL-MCNC: 4.5 G/DL (ref 3.5–5)
ALP BLD-CCNC: 118 U/L (ref 35–125)
ALT SERPL-CCNC: 31 U/L (ref 5–40)
ANION GAP SERPL CALC-SCNC: 15 MMOL/L
APPEARANCE UR: CLEAR
AST SERPL-CCNC: 28 U/L (ref 5–40)
BASOPHILS # BLD AUTO: 0.08 X10*3/UL (ref 0–0.1)
BASOPHILS NFR BLD AUTO: 1.4 %
BILIRUB SERPL-MCNC: 0.2 MG/DL (ref 0.1–1.2)
BILIRUB UR STRIP.AUTO-MCNC: NEGATIVE MG/DL
BUN SERPL-MCNC: 14 MG/DL (ref 8–25)
CALCIUM SERPL-MCNC: 9.7 MG/DL (ref 8.5–10.4)
CHLORIDE SERPL-SCNC: 106 MMOL/L (ref 97–107)
CHOLEST SERPL-MCNC: 176 MG/DL (ref 133–200)
CHOLEST/HDLC SERPL: 2.4 {RATIO}
CO2 SERPL-SCNC: 22 MMOL/L (ref 24–31)
COLOR UR: YELLOW
CREAT SERPL-MCNC: 1 MG/DL (ref 0.4–1.6)
EGFRCR SERPLBLD CKD-EPI 2021: 66 ML/MIN/1.73M*2
EOSINOPHIL # BLD AUTO: 0.17 X10*3/UL (ref 0–0.7)
EOSINOPHIL NFR BLD AUTO: 3 %
ERYTHROCYTE [DISTWIDTH] IN BLOOD BY AUTOMATED COUNT: 13.9 % (ref 11.5–14.5)
EST. AVERAGE GLUCOSE BLD GHB EST-MCNC: 117 MG/DL
GLUCOSE SERPL-MCNC: 92 MG/DL (ref 65–99)
GLUCOSE UR STRIP.AUTO-MCNC: NORMAL MG/DL
HBA1C MFR BLD: 5.7 %
HCT VFR BLD AUTO: 43 % (ref 36–46)
HDLC SERPL-MCNC: 72 MG/DL
HGB BLD-MCNC: 13.8 G/DL (ref 12–16)
HYALINE CASTS #/AREA URNS AUTO: ABNORMAL /LPF
IMM GRANULOCYTES # BLD AUTO: 0.01 X10*3/UL (ref 0–0.7)
IMM GRANULOCYTES NFR BLD AUTO: 0.2 % (ref 0–0.9)
KETONES UR STRIP.AUTO-MCNC: NEGATIVE MG/DL
LDLC SERPL CALC-MCNC: 92 MG/DL (ref 65–130)
LEUKOCYTE ESTERASE UR QL STRIP.AUTO: NEGATIVE
LYMPHOCYTES # BLD AUTO: 2.07 X10*3/UL (ref 1.2–4.8)
LYMPHOCYTES NFR BLD AUTO: 36.8 %
MCH RBC QN AUTO: 29.7 PG (ref 26–34)
MCHC RBC AUTO-ENTMCNC: 32.1 G/DL (ref 32–36)
MCV RBC AUTO: 93 FL (ref 80–100)
MONOCYTES # BLD AUTO: 0.6 X10*3/UL (ref 0.1–1)
MONOCYTES NFR BLD AUTO: 10.7 %
MUCOUS THREADS #/AREA URNS AUTO: ABNORMAL /LPF
NEUTROPHILS # BLD AUTO: 2.69 X10*3/UL (ref 1.2–7.7)
NEUTROPHILS NFR BLD AUTO: 47.9 %
NITRITE UR QL STRIP.AUTO: NEGATIVE
NRBC BLD-RTO: 0 /100 WBCS (ref 0–0)
PH UR STRIP.AUTO: 5 [PH]
PLATELET # BLD AUTO: 303 X10*3/UL (ref 150–450)
POTASSIUM SERPL-SCNC: 4.3 MMOL/L (ref 3.4–5.1)
PROT SERPL-MCNC: 8 G/DL (ref 5.9–7.9)
PROT UR STRIP.AUTO-MCNC: NORMAL MG/DL
RBC # BLD AUTO: 4.64 X10*6/UL (ref 4–5.2)
RBC # UR STRIP.AUTO: NEGATIVE /UL
RBC #/AREA URNS AUTO: ABNORMAL /HPF
SODIUM SERPL-SCNC: 143 MMOL/L (ref 133–145)
SP GR UR STRIP.AUTO: 1.02
SQUAMOUS #/AREA URNS AUTO: ABNORMAL /HPF
TRIGL SERPL-MCNC: 61 MG/DL (ref 40–150)
TSH SERPL DL<=0.05 MIU/L-ACNC: 1.85 MIU/L (ref 0.27–4.2)
UROBILINOGEN UR STRIP.AUTO-MCNC: NORMAL MG/DL
WBC # BLD AUTO: 5.6 X10*3/UL (ref 4.4–11.3)
WBC #/AREA URNS AUTO: ABNORMAL /HPF

## 2024-03-22 PROCEDURE — 80061 LIPID PANEL: CPT

## 2024-03-22 PROCEDURE — 1036F TOBACCO NON-USER: CPT | Performed by: PHYSICIAN ASSISTANT

## 2024-03-22 PROCEDURE — 36415 COLL VENOUS BLD VENIPUNCTURE: CPT

## 2024-03-22 PROCEDURE — 80053 COMPREHEN METABOLIC PANEL: CPT

## 2024-03-22 PROCEDURE — 99396 PREV VISIT EST AGE 40-64: CPT | Performed by: PHYSICIAN ASSISTANT

## 2024-03-22 PROCEDURE — 81001 URINALYSIS AUTO W/SCOPE: CPT

## 2024-03-22 PROCEDURE — 83036 HEMOGLOBIN GLYCOSYLATED A1C: CPT

## 2024-03-22 PROCEDURE — 84443 ASSAY THYROID STIM HORMONE: CPT

## 2024-03-22 PROCEDURE — 85025 COMPLETE CBC W/AUTO DIFF WBC: CPT

## 2024-03-22 RX ORDER — BUPROPION HYDROCHLORIDE 300 MG/1
TABLET ORAL
Qty: 90 TABLET | Refills: 3 | Status: SHIPPED | OUTPATIENT
Start: 2024-03-22

## 2024-03-22 ASSESSMENT — ENCOUNTER SYMPTOMS
FREQUENCY: 0
BACK PAIN: 1
SHORTNESS OF BREATH: 0
BLOOD IN STOOL: 0
DIARRHEA: 0
NUMBNESS: 1
NAUSEA: 0
THOUGHT CONTENT - OBSESSIONS: 0
DIZZINESS: 0
COLOR CHANGE: 0
ACTIVITY CHANGE: 0
MYALGIAS: 1
DEPRESSED MOOD: 0
NERVOUS/ANXIOUS: 1
ARTHRALGIAS: 1
SLEEP DISTURBANCE: 0
PANIC: 0
WHEEZING: 0
ABDOMINAL PAIN: 0
TREMORS: 0
PALPITATIONS: 0
CONSTIPATION: 0
CHEST TIGHTNESS: 0
LIGHT-HEADEDNESS: 0
APPETITE CHANGE: 0
RESTLESSNESS: 0

## 2024-03-22 ASSESSMENT — PAIN SCALES - GENERAL: PAINLEVEL: 0-NO PAIN

## 2024-03-22 NOTE — PROGRESS NOTES
Subjective   Patient ID: Eugenie Jung is a 58 y.o. female who presents for Annual Exam (Hysterectomy. EKG 2024. Has never had colon cancer screening. ).    Anxiety  Presents for follow-up visit. Symptoms include nervous/anxious behavior. Patient reports no chest pain, depressed mood, dizziness, excessive worry, nausea, obsessions, palpitations, panic, restlessness or shortness of breath. Symptoms occur occasionally. The quality of sleep is fair.        Patient sees rheumatology for her lupus but is only on minimal medication.  She says she does feel better oddly enough.  She has a lot of chronic pain but her occupation also does not help that.    Review of Systems   Constitutional:  Negative for activity change and appetite change.   HENT:  Negative for dental problem.    Eyes:  Negative for visual disturbance.   Respiratory:  Negative for chest tightness, shortness of breath and wheezing.    Cardiovascular:  Negative for chest pain, palpitations and leg swelling.   Gastrointestinal:  Negative for abdominal pain, blood in stool, constipation, diarrhea and nausea.   Endocrine: Negative for cold intolerance and heat intolerance.   Genitourinary:  Negative for frequency and urgency.   Musculoskeletal:  Positive for arthralgias, back pain and myalgias.   Skin:  Negative for color change.   Allergic/Immunologic: Negative for immunocompromised state.   Neurological:  Positive for numbness. Negative for dizziness, tremors and light-headedness.   Psychiatric/Behavioral:  Negative for behavioral problems and sleep disturbance. The patient is nervous/anxious.        Objective   BP (!) 126/96   Pulse 81   Wt 88.9 kg (196 lb)   SpO2 97%   BMI 32.62 kg/m²     Physical Exam  Constitutional:       Appearance: She is obese.   HENT:      Right Ear: Tympanic membrane normal.      Left Ear: Tympanic membrane normal.      Nose: Nose normal.      Mouth/Throat:      Mouth: Mucous membranes are moist.   Cardiovascular:      Rate  and Rhythm: Normal rate and regular rhythm.      Pulses: Normal pulses.      Heart sounds: Murmur heard.   Pulmonary:      Effort: Pulmonary effort is normal. No respiratory distress.   Chest:   Breasts:     Right: No swelling, inverted nipple or mass.      Left: No swelling, inverted nipple or mass.   Abdominal:      General: Bowel sounds are normal.      Tenderness: There is no abdominal tenderness.   Musculoskeletal:      Cervical back: Normal range of motion. No tenderness.      Right lower leg: No edema.      Left lower leg: No edema.   Skin:     General: Skin is warm.   Neurological:      General: No focal deficit present.      Mental Status: She is alert. Mental status is at baseline.   Psychiatric:         Mood and Affect: Mood normal.         Thought Content: Thought content normal.         Judgment: Judgment normal.         Assessment/Plan   Diagnoses and all orders for this visit:  Encounter for screening for malignant neoplasm of colon  -     Cologuard® colon cancer screening; Future  Routine medical exam  -     CBC and Auto Differential; Future  -     Comprehensive Metabolic Panel; Future  -     Hemoglobin A1C; Future  -     Lipid Panel; Future  -     TSH with reflex to Free T4 if abnormal; Future  -     Urinalysis with Reflex Microscopic; Future  Visit for screening mammogram  -     BI mammo bilateral screening tomosynthesis; Future  Anxiety  -     CBC and Auto Differential; Future  -     Comprehensive Metabolic Panel; Future  -     Hemoglobin A1C; Future  -     Lipid Panel; Future  -     TSH with reflex to Free T4 if abnormal; Future  -     buPROPion XL (Wellbutrin XL) 300 mg 24 hr tablet; TAKE ONE TABLET BY MOUTH EVERY 24 HOURS  Other forms of systemic lupus erythematosus, unspecified organ involvement status (CMS/HCC)  Hyperparathyroidism, unspecified (CMS/HCC)  -     Comprehensive Metabolic Panel; Future  Gastroesophageal reflux disease without esophagitis  Aortic valve sclerosis  -      Transthoracic Echo (TTE) Complete; Future  Osteopenia of multiple sites  -     XR DEXA bone density; Future  Depression, unspecified depression type  -     buPROPion XL (Wellbutrin XL) 300 mg 24 hr tablet; TAKE ONE TABLET BY MOUTH EVERY 24 HOURS  Other orders  -     perflutren lipid microspheres (Definity) injection 0.5-10 mL of dilution  -     sulfur hexafluoride microsphr (Lumason) injection 24.28 mg  -     perflutren protein A microsphere (Optison) injection 0.5 mL  -     Follow Up In Primary Care - Established; Future    Patient's last echo was 5 years ago we will repeat that.  She is also due for bone density has osteopenia.  She sees endocrinology as well.  Will recheck her blood pressure in the next month or 2.

## 2024-03-26 ENCOUNTER — HOSPITAL ENCOUNTER (OUTPATIENT)
Dept: RADIOLOGY | Facility: HOSPITAL | Age: 59
Discharge: HOME | End: 2024-03-26
Payer: COMMERCIAL

## 2024-03-26 DIAGNOSIS — M85.89 OSTEOPENIA OF MULTIPLE SITES: ICD-10-CM

## 2024-03-26 PROCEDURE — 77080 DXA BONE DENSITY AXIAL: CPT

## 2024-04-03 LAB — NONINV COLON CA DNA+OCC BLD SCRN STL QL: NEGATIVE

## 2024-04-05 ENCOUNTER — HOSPITAL ENCOUNTER (OUTPATIENT)
Dept: RADIOLOGY | Facility: HOSPITAL | Age: 59
Discharge: HOME | End: 2024-04-05
Payer: COMMERCIAL

## 2024-04-05 VITALS — BODY MASS INDEX: 32.49 KG/M2 | HEIGHT: 65 IN | WEIGHT: 195 LBS

## 2024-04-05 DIAGNOSIS — Z12.31 VISIT FOR SCREENING MAMMOGRAM: ICD-10-CM

## 2024-04-05 PROCEDURE — 77067 SCR MAMMO BI INCL CAD: CPT | Performed by: RADIOLOGY

## 2024-04-05 PROCEDURE — 77067 SCR MAMMO BI INCL CAD: CPT

## 2024-04-05 PROCEDURE — 77063 BREAST TOMOSYNTHESIS BI: CPT | Performed by: RADIOLOGY

## 2024-04-12 ENCOUNTER — HOSPITAL ENCOUNTER (OUTPATIENT)
Dept: CARDIOLOGY | Facility: HOSPITAL | Age: 59
Discharge: HOME | End: 2024-04-12
Payer: COMMERCIAL

## 2024-04-12 DIAGNOSIS — I35.8 AORTIC VALVE SCLEROSIS: ICD-10-CM

## 2024-04-12 LAB
AORTIC VALVE PEAK VELOCITY: 1.29 M/S
AV PEAK GRADIENT: 6.7 MMHG
AVA (PEAK VEL): 2.95 CM2
EJECTION FRACTION APICAL 4 CHAMBER: 60.5
GLOBAL LONGITUDINAL STRAIN: -20.1 %
LEFT ATRIUM VOLUME AREA LENGTH INDEX BSA: 28.3 ML/M2
LEFT VENTRICLE INTERNAL DIMENSION DIASTOLE: 4.05 CM (ref 3.5–6)
LEFT VENTRICULAR OUTFLOW TRACT DIAMETER: 2 CM
LV EJECTION FRACTION BIPLANE: 61 %
MITRAL VALVE E/A RATIO: 0.8
MITRAL VALVE E/E' RATIO: 9.71
RIGHT VENTRICLE FREE WALL PEAK S': 17.1 CM/S
TRICUSPID ANNULAR PLANE SYSTOLIC EXCURSION: 1.8 CM

## 2024-04-12 PROCEDURE — 76376 3D RENDER W/INTRP POSTPROCES: CPT | Performed by: INTERNAL MEDICINE

## 2024-04-12 PROCEDURE — 76376 3D RENDER W/INTRP POSTPROCES: CPT

## 2024-04-12 PROCEDURE — 93306 TTE W/DOPPLER COMPLETE: CPT | Performed by: INTERNAL MEDICINE

## 2024-04-12 PROCEDURE — 93356 MYOCRD STRAIN IMG SPCKL TRCK: CPT | Performed by: INTERNAL MEDICINE

## 2024-05-03 ENCOUNTER — HOSPITAL ENCOUNTER (OUTPATIENT)
Dept: RADIOLOGY | Facility: HOSPITAL | Age: 59
Discharge: HOME | End: 2024-05-03
Payer: COMMERCIAL

## 2024-05-03 DIAGNOSIS — M47.816 SPONDYLOSIS WITHOUT MYELOPATHY OR RADICULOPATHY, LUMBAR REGION: ICD-10-CM

## 2024-05-03 DIAGNOSIS — G89.11 ACUTE PAIN DUE TO TRAUMA: ICD-10-CM

## 2024-05-03 DIAGNOSIS — M54.59 OTHER LOW BACK PAIN: ICD-10-CM

## 2024-05-03 PROCEDURE — 72072 X-RAY EXAM THORAC SPINE 3VWS: CPT | Performed by: RADIOLOGY

## 2024-05-03 PROCEDURE — 72100 X-RAY EXAM L-S SPINE 2/3 VWS: CPT

## 2024-05-03 PROCEDURE — 72072 X-RAY EXAM THORAC SPINE 3VWS: CPT

## 2024-05-03 PROCEDURE — 72100 X-RAY EXAM L-S SPINE 2/3 VWS: CPT | Performed by: RADIOLOGY

## 2024-06-06 ENCOUNTER — OFFICE VISIT (OUTPATIENT)
Dept: PRIMARY CARE | Facility: CLINIC | Age: 59
End: 2024-06-06
Payer: COMMERCIAL

## 2024-06-06 ENCOUNTER — HOSPITAL ENCOUNTER (OUTPATIENT)
Dept: RADIOLOGY | Facility: CLINIC | Age: 59
Discharge: HOME | End: 2024-06-06
Payer: COMMERCIAL

## 2024-06-06 VITALS
SYSTOLIC BLOOD PRESSURE: 138 MMHG | OXYGEN SATURATION: 98 % | WEIGHT: 200 LBS | HEART RATE: 88 BPM | DIASTOLIC BLOOD PRESSURE: 94 MMHG | BODY MASS INDEX: 33.28 KG/M2

## 2024-06-06 DIAGNOSIS — R07.81 RIB PAIN: ICD-10-CM

## 2024-06-06 PROCEDURE — 99213 OFFICE O/P EST LOW 20 MIN: CPT | Performed by: PHYSICIAN ASSISTANT

## 2024-06-06 PROCEDURE — 71101 X-RAY EXAM UNILAT RIBS/CHEST: CPT | Mod: LT

## 2024-06-06 PROCEDURE — 71101 X-RAY EXAM UNILAT RIBS/CHEST: CPT | Mod: LEFT SIDE | Performed by: RADIOLOGY

## 2024-06-06 ASSESSMENT — PAIN SCALES - GENERAL: PAINLEVEL: 7

## 2024-06-06 NOTE — PROGRESS NOTES
"Subjective   Patient ID: Eugenie Jung is a 58 y.o. female who presents for Rib Injury (Injured self on washer this past Sunday while leaning it over it, felt and heard a \"pop\" on left side under breast, pain wraps around side. Taking ibuprofen, using ice and lidocaine patches.).    HPI   Pt has had left rib pain since Sunday. She leaned over the washer machine and her mom was behind her. She leaned on her and then felt the pop in her ribs.  Review of Systems   Respiratory:  Positive for chest tightness. Negative for shortness of breath and wheezing.    Cardiovascular:  Positive for chest pain.   Musculoskeletal:  Positive for arthralgias.       Objective   BP (!) 138/94   Pulse 88   Wt 90.7 kg (200 lb)   SpO2 98%   BMI 33.28 kg/m²     Physical Exam  Eyes:      Pupils: Pupils are equal, round, and reactive to light.   Cardiovascular:      Rate and Rhythm: Normal rate and regular rhythm.   Pulmonary:      Effort: Pulmonary effort is normal.   Chest:      Chest wall: Tenderness present.   Musculoskeletal:      Cervical back: Neck supple.   Neurological:      Mental Status: She is alert.     Pain of left antierior ribs to the mid axillary line. No swelling or bruising seen.    Assessment/Plan   Diagnoses and all orders for this visit:  Rib pain  Will obtain xray to rule out fx. Discussed using heat and taking deep breaths.        "

## 2024-06-09 ASSESSMENT — ENCOUNTER SYMPTOMS
ARTHRALGIAS: 1
CHEST TIGHTNESS: 1
SHORTNESS OF BREATH: 0
WHEEZING: 0

## 2024-06-21 ENCOUNTER — TELEPHONE (OUTPATIENT)
Dept: PRIMARY CARE | Facility: CLINIC | Age: 59
End: 2024-06-21

## 2024-06-21 ENCOUNTER — APPOINTMENT (OUTPATIENT)
Dept: PRIMARY CARE | Facility: CLINIC | Age: 59
End: 2024-06-21
Payer: COMMERCIAL

## 2024-06-21 VITALS
WEIGHT: 200 LBS | DIASTOLIC BLOOD PRESSURE: 82 MMHG | BODY MASS INDEX: 33.28 KG/M2 | HEART RATE: 76 BPM | SYSTOLIC BLOOD PRESSURE: 130 MMHG | OXYGEN SATURATION: 98 %

## 2024-06-21 DIAGNOSIS — R03.0 ELEVATED BLOOD PRESSURE READING: Primary | ICD-10-CM

## 2024-06-21 DIAGNOSIS — F32.A DEPRESSION, UNSPECIFIED DEPRESSION TYPE: ICD-10-CM

## 2024-06-21 DIAGNOSIS — F41.9 ANXIETY: ICD-10-CM

## 2024-06-21 PROCEDURE — 1036F TOBACCO NON-USER: CPT | Performed by: PHYSICIAN ASSISTANT

## 2024-06-21 PROCEDURE — 99213 OFFICE O/P EST LOW 20 MIN: CPT | Performed by: PHYSICIAN ASSISTANT

## 2024-06-21 ASSESSMENT — PAIN SCALES - GENERAL: PAINLEVEL: 0-NO PAIN

## 2024-06-21 ASSESSMENT — ENCOUNTER SYMPTOMS
PALPITATIONS: 0
HYPERTENSION: 1
NERVOUS/ANXIOUS: 1
SHORTNESS OF BREATH: 0
LIGHT-HEADEDNESS: 0
APPETITE CHANGE: 0
ACTIVITY CHANGE: 0
NUMBNESS: 0
DIZZINESS: 0
CHEST TIGHTNESS: 0

## 2024-06-21 NOTE — PROGRESS NOTES
Subjective   Patient ID: Eugenie Jung is a 58 y.o. female who presents for Follow-up (Recheck elevated blood pressure).    Hypertension  This is a recurrent problem. Pertinent negatives include no chest pain, palpitations or shortness of breath. (Rib pain resolved. No more pain) Risk factors for coronary artery disease include obesity and smoking/tobacco exposure.    Has not been watching her salt intake.    Review of Systems   Constitutional:  Negative for activity change and appetite change.   Respiratory:  Negative for chest tightness and shortness of breath.    Cardiovascular:  Negative for chest pain, palpitations and leg swelling.   Neurological:  Negative for dizziness, light-headedness and numbness.   Psychiatric/Behavioral:  Positive for behavioral problems. The patient is nervous/anxious.        Objective   /82   Pulse 76   Wt 90.7 kg (200 lb)   SpO2 98%   BMI 33.28 kg/m²     Physical Exam  Eyes:      Extraocular Movements: Extraocular movements intact.      Pupils: Pupils are equal, round, and reactive to light.   Skin:     General: Skin is warm.   Neurological:      General: No focal deficit present.      Mental Status: She is alert. Mental status is at baseline.   Psychiatric:         Mood and Affect: Mood normal.         Behavior: Behavior normal.         Thought Content: Thought content normal.         Judgment: Judgment normal.         Assessment/Plan   Diagnoses and all orders for this visit:  Elevated blood pressure reading  Anxiety  Depression, unspecified depression type  Other orders  -     Follow Up In Primary Care - Established  -     Follow Up In Primary Care - Established; Future  Will recheck it in 3 months.

## 2024-07-22 ENCOUNTER — APPOINTMENT (OUTPATIENT)
Dept: ENDOCRINOLOGY | Facility: CLINIC | Age: 59
End: 2024-07-22
Payer: COMMERCIAL

## 2024-07-22 VITALS
WEIGHT: 201 LBS | BODY MASS INDEX: 33.45 KG/M2 | HEART RATE: 85 BPM | DIASTOLIC BLOOD PRESSURE: 85 MMHG | SYSTOLIC BLOOD PRESSURE: 137 MMHG

## 2024-07-22 DIAGNOSIS — E21.3 HYPERPARATHYROIDISM, UNSPECIFIED (MULTI): Primary | ICD-10-CM

## 2024-07-22 PROCEDURE — 99214 OFFICE O/P EST MOD 30 MIN: CPT | Performed by: INTERNAL MEDICINE

## 2024-07-22 PROCEDURE — 1036F TOBACCO NON-USER: CPT | Performed by: INTERNAL MEDICINE

## 2024-07-22 RX ORDER — CEPHRADINE 500 MG
CAPSULE ORAL 2 TIMES DAILY
COMMUNITY

## 2024-07-22 ASSESSMENT — ENCOUNTER SYMPTOMS
TROUBLE SWALLOWING: 1
FEVER: 0
ABDOMINAL PAIN: 0
VOMITING: 0
NERVOUS/ANXIOUS: 1
HEADACHES: 0
TREMORS: 0
DIARRHEA: 0
UNEXPECTED WEIGHT CHANGE: 1
SHORTNESS OF BREATH: 0
WEAKNESS: 0
FATIGUE: 1
ARTHRALGIAS: 1
NAUSEA: 0
PALPITATIONS: 0
MYALGIAS: 1
SORE THROAT: 1
DIZZINESS: 1
BACK PAIN: 1
CONSTIPATION: 0
NECK PAIN: 1

## 2024-07-22 NOTE — PROGRESS NOTES
History Of Present Illness  Eugenie Jung is a 58 y.o. female with hyperparathyroidism    Vitamin D3 1000 units with K2, BID  Increased dose 2 months ago and added the K2    History of fractures in her 20s, left distal ulna, left ankle     DEXA scan done 2017  No history of osteoporosis  No history of kidney stone    Treated for SLE  Hydroxychloroquine    Past Medical History  She has a past medical history of Antiphospholipid antibody syndrome (Multi), Anxiety, GERD (gastroesophageal reflux disease) (All the time), Headache (Sporadic), Personal history of diseases of the blood and blood-forming organs and certain disorders involving the immune mechanism, and Systemic lupus erythematosus, unspecified (Multi) (08/15/2022).    Surgical History  She has a past surgical history that includes Other surgical history (08/15/2022); Other surgical history (08/15/2022); Other surgical history (08/15/2022); Other surgical history (2022); Other surgical history (2022);  section, classic; Knee surgery; Appendectomy (); Cholecystectomy ();  section, low transverse (); Hysterectomy (1999); and [place] tooth extraction ().     Social History  She reports that she quit smoking about 6 years ago. Her smoking use included cigarettes. She started smoking about 40 years ago. She has a 17.1 pack-year smoking history. She has never used smokeless tobacco. She reports that she does not currently use alcohol. She reports that she does not use drugs.    Family History  Family History   Problem Relation Name Age of Onset    Hyperlipidemia Mother      Hypertension Mother      Epilepsy Father      Dementia Father      Hypertension Father      Hyperlipidemia Father      Hodgkin's lymphoma Sister      Multiple sclerosis Father's Sister Dianne     Breast cancer Father's Sister Dianne     Leukemia Father's Sister Dianne     Diabetes Maternal Grandmother Betzy raman     Other (absence seizure) Son          Medications  Current Outpatient Medications   Medication Instructions    albuterol (ProAir HFA) 90 mcg/actuation inhaler 2 puffs, inhalation, Every 6 hours PRN    aspirin 81 mg chewable tablet oral    buPROPion XL (Wellbutrin XL) 300 mg 24 hr tablet TAKE ONE TABLET BY MOUTH EVERY 24 HOURS    hydroxychloroquine (Plaquenil) 200 mg tablet 1 tablet, oral, 2 times daily    multivitamin (Multiple Vitamins) tablet oral    vitamin D3-vitamin K2, MK4, (K2 Plus D3) 1,000-100 unit-mcg tablet oral, 2 times daily       Allergies  Latex, natural rubber; Morphine; Prednisone; Cortisone; and Sulfamethoxazole    Review of Systems   Constitutional:  Positive for fatigue and unexpected weight change. Negative for fever.   HENT:  Positive for sore throat and trouble swallowing.    Eyes:  Negative for visual disturbance.   Respiratory:  Negative for shortness of breath.    Cardiovascular:  Negative for chest pain and palpitations.   Gastrointestinal:  Negative for abdominal pain, constipation, diarrhea, nausea and vomiting.   Endocrine: Negative for cold intolerance and heat intolerance.   Musculoskeletal:  Positive for arthralgias, back pain, myalgias and neck pain.   Skin:  Negative for rash.   Neurological:  Positive for dizziness. Negative for tremors, weakness and headaches.   Psychiatric/Behavioral:  The patient is nervous/anxious.          Last Recorded Vitals  Blood pressure 137/85, pulse 85, weight 91.2 kg (201 lb).    Physical Exam  Constitutional:       General: She is not in acute distress.  HENT:      Head: Normocephalic.      Mouth/Throat:      Mouth: Mucous membranes are moist.   Eyes:      Extraocular Movements: Extraocular movements intact.   Neck:      Thyroid: No thyroid mass or thyromegaly.   Cardiovascular:      Pulses:           Radial pulses are 2+ on the right side and 2+ on the left side.   Musculoskeletal:      Right lower leg: No edema.      Left lower leg: No edema.      Comments: Normal spine curvature    Lymphadenopathy:      Cervical: No cervical adenopathy.   Neurological:      Mental Status: She is alert.      Motor: No tremor.   Psychiatric:         Mood and Affect: Affect normal.          Relevant Results  Lab Results   Component Value Date    TSH 1.85 03/22/2024      Latest Reference Range & Units 03/22/24 10:50   GLUCOSE 65 - 99 mg/dL 92   SODIUM 133 - 145 mmol/L 143   POTASSIUM 3.4 - 5.1 mmol/L 4.3   CHLORIDE 97 - 107 mmol/L 106   Bicarbonate 24 - 31 mmol/L 22 (L)   Anion Gap <=19 mmol/L 15   Blood Urea Nitrogen 8 - 25 mg/dL 14   Creatinine 0.40 - 1.60 mg/dL 1.00   EGFR >60 mL/min/1.73m*2 66   Calcium 8.5 - 10.4 mg/dL 9.7      Latest Reference Range & Units 11/03/23 14:34   GLUCOSE 74 - 99 mg/dL 79   SODIUM 136 - 145 mmol/L 141   POTASSIUM 3.5 - 5.3 mmol/L 4.5   CHLORIDE 98 - 107 mmol/L 104   Bicarbonate 21 - 32 mmol/L 29   Anion Gap 10 - 20 mmol/L 13   Blood Urea Nitrogen 6 - 23 mg/dL 19   Creatinine 0.50 - 1.05 mg/dL 1.03   EGFR >60 mL/min/1.73m*2 64   Calcium 8.6 - 10.6 mg/dL 9.9   PHOSPHORUS 2.5 - 4.9 mg/dL 4.6   Albumin 3.4 - 5.0 g/dL 4.2   Alkaline Phosphatase 33 - 110 U/L 159 (H)   ALT 7 - 45 U/L 31   AST 9 - 39 U/L 31   Bilirubin Total 0.0 - 1.2 mg/dL 0.3   Total Protein 6.4 - 8.2 g/dL 7.8   MAGNESIUM 1.60 - 2.40 mg/dL 2.42 (H)   Parathyroid Hormone, Intact 18.5 - 88.0 pg/mL 133.2 (H)   Vitamin D, 25-Hydroxy, Total 30 - 100 ng/mL 35       DEXA BONE DENSITY (3/26/24)  FINDINGS:  SPINE L1-L4  Bone Mineral Density: 0.865 grams/centimeter squared  T-Score -1.7 Z-Score -0.4  Bone Mineral Density change vs baseline:  -3.2%  Bone Mineral Density change vs previous: -3.2%      LEFT FEMUR -TOTAL  Bone Mineral Density: 0.875 grams/centimeter squared  T-Score -0.5 Z-Score  0.3  Bone Mineral Density change vs baseline: -7.0%  Bone Mineral Density change vs previous: -7.0%      LEFT FEMUR -NECK  Bone Mineral Density: 0.714 grams/centimeter squared  T-Score -1.2 Z-Score -0.1  Bone Mineral Density change vs  baseline:  Bone Mineral Density change vs previous:          IMPRESSION  HYPERPARATHYROIDISM, UNSPECIFIED  History of PTH elevation, persists.   Last labs not available.   No history of hypercalcemia  No history of kidney stones  History of osteopenia  Normal renal function  No hypercalcemia to suggest primary hyperparathyroidism      RECOMMENDATIONS  No intervention required this time.    Request labs from Dr. Herrera.    Repeat 25-OH vitamin D level and PTH with September/October labs  Follow up October.

## 2024-07-22 NOTE — LETTER
2024     Aliza Gibson PA-C  [address] [facility]  [address]  [place] [address]    Patient: Eugenie Jung   YOB: 1965   Date of Visit: 2024       Dear Dr. Aliza Gibson PA-C:    Thank you for referring Eugenie Jung to me for evaluation. Below are my notes for this consultation.  If you have questions, please do not hesitate to call me. I look forward to following your patient along with you.       Sincerely,     Jayce Villatoro MD      CC: Elodia Herrera MD  ______________________________________________________________________________________    History Of Present Illness  Eugenie Jung is a 58 y.o. female with hyperparathyroidism    Vitamin D3 1000 units with K2, BID  Increased dose 2 months ago and added the K2    History of fractures in her 20s, left distal ulna, left ankle     DEXA scan done 2017  No history of osteoporosis  No history of kidney stone    Treated for SLE  Hydroxychloroquine    Past Medical History  She has a past medical history of Antiphospholipid antibody syndrome (Multi), Anxiety, GERD (gastroesophageal reflux disease) (All the time), Headache (Sporadic), Personal history of diseases of the blood and blood-forming organs and certain disorders involving the immune mechanism, and Systemic lupus erythematosus, unspecified (Multi) (08/15/2022).    Surgical History  She has a past surgical history that includes Other surgical history (08/15/2022); Other surgical history (08/15/2022); Other surgical history (08/15/2022); Other surgical history (2022); Other surgical history (2022);  section, classic; Knee surgery; Appendectomy (); Cholecystectomy ();  section, low transverse (); Hysterectomy (1999); and [place] tooth extraction ().     Social History  She reports that she quit smoking about 6 years ago. Her smoking use included cigarettes. She started smoking about 40 years ago.  She has a 17.1 pack-year smoking history. She has never used smokeless tobacco. She reports that she does not currently use alcohol. She reports that she does not use drugs.    Family History  Family History   Problem Relation Name Age of Onset   • Hyperlipidemia Mother     • Hypertension Mother     • Epilepsy Father     • Dementia Father     • Hypertension Father     • Hyperlipidemia Father     • Hodgkin's lymphoma Sister     • Multiple sclerosis Father's Sister Dianne    • Breast cancer Father's Sister Dianne    • Leukemia Father's Sister Dianne    • Diabetes Maternal Grandmother Betzy raman    • Other (absence seizure) Son         Medications  Current Outpatient Medications   Medication Instructions   • albuterol (ProAir HFA) 90 mcg/actuation inhaler 2 puffs, inhalation, Every 6 hours PRN   • aspirin 81 mg chewable tablet oral   • buPROPion XL (Wellbutrin XL) 300 mg 24 hr tablet TAKE ONE TABLET BY MOUTH EVERY 24 HOURS   • hydroxychloroquine (Plaquenil) 200 mg tablet 1 tablet, oral, 2 times daily   • multivitamin (Multiple Vitamins) tablet oral   • vitamin D3-vitamin K2, MK4, (K2 Plus D3) 1,000-100 unit-mcg tablet oral, 2 times daily       Allergies  Latex, natural rubber; Morphine; Prednisone; Cortisone; and Sulfamethoxazole    Review of Systems   Constitutional:  Positive for fatigue and unexpected weight change. Negative for fever.   HENT:  Positive for sore throat and trouble swallowing.    Eyes:  Negative for visual disturbance.   Respiratory:  Negative for shortness of breath.    Cardiovascular:  Negative for chest pain and palpitations.   Gastrointestinal:  Negative for abdominal pain, constipation, diarrhea, nausea and vomiting.   Endocrine: Negative for cold intolerance and heat intolerance.   Musculoskeletal:  Positive for arthralgias, back pain, myalgias and neck pain.   Skin:  Negative for rash.   Neurological:  Positive for dizziness. Negative for tremors, weakness and headaches.    Psychiatric/Behavioral:  The patient is nervous/anxious.          Last Recorded Vitals  Blood pressure 137/85, pulse 85, weight 91.2 kg (201 lb).    Physical Exam  Constitutional:       General: She is not in acute distress.  HENT:      Head: Normocephalic.      Mouth/Throat:      Mouth: Mucous membranes are moist.   Eyes:      Extraocular Movements: Extraocular movements intact.   Neck:      Thyroid: No thyroid mass or thyromegaly.   Cardiovascular:      Pulses:           Radial pulses are 2+ on the right side and 2+ on the left side.   Musculoskeletal:      Right lower leg: No edema.      Left lower leg: No edema.      Comments: Normal spine curvature   Lymphadenopathy:      Cervical: No cervical adenopathy.   Neurological:      Mental Status: She is alert.      Motor: No tremor.   Psychiatric:         Mood and Affect: Affect normal.          Relevant Results  Lab Results   Component Value Date    TSH 1.85 03/22/2024      Latest Reference Range & Units 03/22/24 10:50   GLUCOSE 65 - 99 mg/dL 92   SODIUM 133 - 145 mmol/L 143   POTASSIUM 3.4 - 5.1 mmol/L 4.3   CHLORIDE 97 - 107 mmol/L 106   Bicarbonate 24 - 31 mmol/L 22 (L)   Anion Gap <=19 mmol/L 15   Blood Urea Nitrogen 8 - 25 mg/dL 14   Creatinine 0.40 - 1.60 mg/dL 1.00   EGFR >60 mL/min/1.73m*2 66   Calcium 8.5 - 10.4 mg/dL 9.7      Latest Reference Range & Units 11/03/23 14:34   GLUCOSE 74 - 99 mg/dL 79   SODIUM 136 - 145 mmol/L 141   POTASSIUM 3.5 - 5.3 mmol/L 4.5   CHLORIDE 98 - 107 mmol/L 104   Bicarbonate 21 - 32 mmol/L 29   Anion Gap 10 - 20 mmol/L 13   Blood Urea Nitrogen 6 - 23 mg/dL 19   Creatinine 0.50 - 1.05 mg/dL 1.03   EGFR >60 mL/min/1.73m*2 64   Calcium 8.6 - 10.6 mg/dL 9.9   PHOSPHORUS 2.5 - 4.9 mg/dL 4.6   Albumin 3.4 - 5.0 g/dL 4.2   Alkaline Phosphatase 33 - 110 U/L 159 (H)   ALT 7 - 45 U/L 31   AST 9 - 39 U/L 31   Bilirubin Total 0.0 - 1.2 mg/dL 0.3   Total Protein 6.4 - 8.2 g/dL 7.8   MAGNESIUM 1.60 - 2.40 mg/dL 2.42 (H)   Parathyroid  Hormone, Intact 18.5 - 88.0 pg/mL 133.2 (H)   Vitamin D, 25-Hydroxy, Total 30 - 100 ng/mL 35       DEXA BONE DENSITY (3/26/24)  FINDINGS:  SPINE L1-L4  Bone Mineral Density: 0.865 grams/centimeter squared  T-Score -1.7 Z-Score -0.4  Bone Mineral Density change vs baseline:  -3.2%  Bone Mineral Density change vs previous: -3.2%      LEFT FEMUR -TOTAL  Bone Mineral Density: 0.875 grams/centimeter squared  T-Score -0.5 Z-Score  0.3  Bone Mineral Density change vs baseline: -7.0%  Bone Mineral Density change vs previous: -7.0%      LEFT FEMUR -NECK  Bone Mineral Density: 0.714 grams/centimeter squared  T-Score -1.2 Z-Score -0.1  Bone Mineral Density change vs baseline:  Bone Mineral Density change vs previous:          IMPRESSION  HYPERPARATHYROIDISM, UNSPECIFIED  History of PTH elevation, persists.   Last labs not available.   No history of hypercalcemia  No history of kidney stones  History of osteopenia  Normal renal function  No hypercalcemia to suggest primary hyperparathyroidism      RECOMMENDATIONS  No intervention required this time.    Request labs from Dr. Herrera.    Repeat 25-OH vitamin D level and PTH with September/October labs  Follow up October.

## 2024-07-22 NOTE — PATIENT INSTRUCTIONS
RECOMMENDATIONS  No intervention required this time.    Request labs from Dr. Herrera.    Repeat 25-OH vitamin D level and PTH with September/October labs  Follow up October.

## 2024-09-27 ENCOUNTER — APPOINTMENT (OUTPATIENT)
Dept: PRIMARY CARE | Facility: CLINIC | Age: 59
End: 2024-09-27
Payer: COMMERCIAL

## 2024-09-30 ENCOUNTER — APPOINTMENT (OUTPATIENT)
Dept: PRIMARY CARE | Facility: CLINIC | Age: 59
End: 2024-09-30
Payer: COMMERCIAL

## 2024-09-30 VITALS
BODY MASS INDEX: 33.28 KG/M2 | SYSTOLIC BLOOD PRESSURE: 120 MMHG | WEIGHT: 200 LBS | DIASTOLIC BLOOD PRESSURE: 80 MMHG | OXYGEN SATURATION: 97 % | HEART RATE: 91 BPM

## 2024-09-30 DIAGNOSIS — R03.0 ELEVATED BLOOD PRESSURE READING: ICD-10-CM

## 2024-09-30 DIAGNOSIS — M54.50 ACUTE BILATERAL LOW BACK PAIN WITHOUT SCIATICA: Primary | ICD-10-CM

## 2024-09-30 PROBLEM — R30.0 BURNING WITH URINATION: Status: RESOLVED | Noted: 2021-10-25 | Resolved: 2024-09-30

## 2024-09-30 PROBLEM — L50.9 URTICARIA: Status: RESOLVED | Noted: 2020-06-11 | Resolved: 2024-09-30

## 2024-09-30 PROCEDURE — 99213 OFFICE O/P EST LOW 20 MIN: CPT | Performed by: PHYSICIAN ASSISTANT

## 2024-09-30 RX ORDER — DICLOFENAC SODIUM 10 MG/G
GEL TOPICAL
COMMUNITY
Start: 2024-09-05

## 2024-09-30 RX ORDER — METHOCARBAMOL 500 MG/1
500 TABLET, FILM COATED ORAL 3 TIMES DAILY PRN
Qty: 30 TABLET | Refills: 0 | Status: SHIPPED | OUTPATIENT
Start: 2024-09-30 | End: 2024-11-29

## 2024-09-30 ASSESSMENT — ENCOUNTER SYMPTOMS
DIZZINESS: 0
CHEST TIGHTNESS: 0
MYALGIAS: 1
NUMBNESS: 0
ACTIVITY CHANGE: 0
LIGHT-HEADEDNESS: 0
APPETITE CHANGE: 0
ARTHRALGIAS: 1
HYPERTENSION: 1
BACK PAIN: 1
SHORTNESS OF BREATH: 0
SLEEP DISTURBANCE: 1
PALPITATIONS: 0
NERVOUS/ANXIOUS: 1

## 2024-09-30 ASSESSMENT — PAIN SCALES - GENERAL: PAINLEVEL: 7

## 2024-09-30 NOTE — PROGRESS NOTES
Subjective   Patient ID: Eugenie Jung is a 59 y.o. female who presents for Follow-up (Recheck elevated BP).    Hypertension  This is a recurrent problem. The problem is uncontrolled. Associated symptoms include malaise/fatigue. Pertinent negatives include no chest pain, palpitations or shortness of breath.    Can't sleep, back is hurting.  Having family issues.      Review of Systems   Constitutional:  Positive for malaise/fatigue. Negative for activity change and appetite change.   Respiratory:  Negative for chest tightness and shortness of breath.    Cardiovascular:  Negative for chest pain, palpitations and leg swelling.   Musculoskeletal:  Positive for arthralgias, back pain and myalgias. Negative for gait problem.   Neurological:  Negative for dizziness, light-headedness and numbness.   Psychiatric/Behavioral:  Positive for sleep disturbance. The patient is nervous/anxious.        Objective   BP (!) 134/94   Pulse 91   Wt 90.7 kg (200 lb)   SpO2 97%   BMI 33.28 kg/m²     Physical Exam  Constitutional:       Appearance: She is obese.   Neurological:      General: No focal deficit present.      Mental Status: She is alert and oriented to person, place, and time. Mental status is at baseline.   Psychiatric:         Mood and Affect: Mood normal.         Behavior: Behavior normal.         Thought Content: Thought content normal.         Judgment: Judgment normal.         Assessment/Plan   Diagnoses and all orders for this visit:  Acute bilateral low back pain without sciatica  -     methocarbamol (Robaxin) 500 mg tablet; Take 1 tablet (500 mg) by mouth 3 times a day as needed for muscle spasms.  Elevated blood pressure reading  Other orders  -     Follow Up In Primary Care - Established  Blood pressure was better once it was rechecked.  She will use heat for her back as well did send in some Robaxin for now.  Should reevaluate or follow-up with her rheumatologist if no better.

## 2024-10-01 ENCOUNTER — LAB (OUTPATIENT)
Dept: LAB | Facility: LAB | Age: 59
End: 2024-10-01
Payer: COMMERCIAL

## 2024-10-01 DIAGNOSIS — E21.3 HYPERPARATHYROIDISM, UNSPECIFIED (MULTI): ICD-10-CM

## 2024-10-01 LAB
25(OH)D3 SERPL-MCNC: 53 NG/ML (ref 30–100)
ANION GAP SERPL CALC-SCNC: 15 MMOL/L (ref 10–20)
BUN SERPL-MCNC: 20 MG/DL (ref 6–23)
CALCIUM SERPL-MCNC: 9.9 MG/DL (ref 8.6–10.6)
CHLORIDE SERPL-SCNC: 106 MMOL/L (ref 98–107)
CO2 SERPL-SCNC: 25 MMOL/L (ref 21–32)
CREAT SERPL-MCNC: 1.16 MG/DL (ref 0.5–1.05)
EGFRCR SERPLBLD CKD-EPI 2021: 55 ML/MIN/1.73M*2
GLUCOSE SERPL-MCNC: 94 MG/DL (ref 74–99)
POTASSIUM SERPL-SCNC: 4.3 MMOL/L (ref 3.5–5.3)
SODIUM SERPL-SCNC: 142 MMOL/L (ref 136–145)

## 2024-10-01 PROCEDURE — 82306 VITAMIN D 25 HYDROXY: CPT

## 2024-10-01 PROCEDURE — 36415 COLL VENOUS BLD VENIPUNCTURE: CPT

## 2024-10-01 PROCEDURE — 83970 ASSAY OF PARATHORMONE: CPT

## 2024-10-01 PROCEDURE — 80048 BASIC METABOLIC PNL TOTAL CA: CPT

## 2024-10-02 LAB — PTH-INTACT SERPL-MCNC: 113.2 PG/ML (ref 18.5–88)

## 2024-10-07 ENCOUNTER — OFFICE VISIT (OUTPATIENT)
Dept: PRIMARY CARE | Facility: CLINIC | Age: 59
End: 2024-10-07
Payer: COMMERCIAL

## 2024-10-07 VITALS
WEIGHT: 200 LBS | OXYGEN SATURATION: 98 % | BODY MASS INDEX: 33.28 KG/M2 | DIASTOLIC BLOOD PRESSURE: 94 MMHG | HEART RATE: 82 BPM | SYSTOLIC BLOOD PRESSURE: 134 MMHG

## 2024-10-07 DIAGNOSIS — T50.905A ADVERSE EFFECT OF DRUG, INITIAL ENCOUNTER: Primary | ICD-10-CM

## 2024-10-07 PROCEDURE — 99212 OFFICE O/P EST SF 10 MIN: CPT | Performed by: PHYSICIAN ASSISTANT

## 2024-10-07 ASSESSMENT — PAIN SCALES - GENERAL: PAINLEVEL: 0-NO PAIN

## 2024-10-07 NOTE — PROGRESS NOTES
Subjective   Patient ID: Eugenie Jung is a 59 y.o. female who presents for Rash (Started 10/3 with itchy hives on arms and torso. Began after taking methocarbamol. Taking benadryl, using hydrocortisone cream.).    HPI   Took two doses of her muscle  relaxers and noticed the rash a day later.  Review of Systems   Constitutional:  Negative for chills and fever.   Skin:  Positive for rash.       Objective   BP (!) 134/94   Pulse 82   Wt 90.7 kg (200 lb)   SpO2 98%   BMI 33.28 kg/m²     Physical Exam  Constitutional:       Appearance: She is obese.   Skin:     Coloration: Skin is not pale.      Findings: Rash present. Rash is macular, papular and urticarial. Rash is not purpuric, pustular or vesicular.      Comments: Fuhs basically from the neck down.   Neurological:      General: No focal deficit present.      Mental Status: She is alert and oriented to person, place, and time. Mental status is at baseline.   Psychiatric:         Mood and Affect: Mood normal.         Behavior: Behavior normal.         Thought Content: Thought content normal.         Judgment: Judgment normal.         Assessment/Plan   Diagnoses and all orders for this visit:  Adverse effect of drug, initial encounter  Will keep her off the muscle relaxer suggested antihistamines at least 2-3 times a day.  She does not do well on prednisone so we will avoid that.  Should follow-up if symptoms persist or worsen.

## 2024-10-09 ASSESSMENT — ENCOUNTER SYMPTOMS
FEVER: 0
CHILLS: 0

## 2024-10-21 ENCOUNTER — APPOINTMENT (OUTPATIENT)
Dept: ENDOCRINOLOGY | Facility: CLINIC | Age: 59
End: 2024-10-21
Payer: COMMERCIAL

## 2024-10-21 VITALS
BODY MASS INDEX: 33.78 KG/M2 | SYSTOLIC BLOOD PRESSURE: 129 MMHG | DIASTOLIC BLOOD PRESSURE: 83 MMHG | HEART RATE: 85 BPM | WEIGHT: 203 LBS

## 2024-10-21 DIAGNOSIS — E21.3 HYPERPARATHYROIDISM, UNSPECIFIED (MULTI): Primary | ICD-10-CM

## 2024-10-21 PROCEDURE — 99213 OFFICE O/P EST LOW 20 MIN: CPT | Performed by: INTERNAL MEDICINE

## 2024-10-21 NOTE — LETTER
2024     Aliza Gibson PA-C  [address] [facility]  [address]  [place] [address] [place] [address]    Patient: Eugenie Jung   YOB: 1965   Date of Visit: 10/21/2024       Dear Dr. Aliza Gibson PA-C:    Thank you for referring Eugenie Jung to me for evaluation. Below are my notes for this consultation.  If you have questions, please do not hesitate to call me. I look forward to following your patient along with you.       Sincerely,     Jayce Villatoro MD      CC: No Recipients  ______________________________________________________________________________________    History Of Present Illness  Eugenie Jung is a 59 y.o. female with hyperparathyroidism     Vitamin D3 1000 units with K2, BID     History of fractures in her 20s, left distal ulna, left ankle     DEXA scan 3/26/24  Osteopenia  No history of kidney stone     Treated for SLE  Hydroxychloroquine    Past Medical History  She has a past medical history of Antiphospholipid antibody syndrome (Multi), Anxiety, GERD (gastroesophageal reflux disease) (All the time), Headache (Sporadic), Personal history of diseases of the blood and blood-forming organs and certain disorders involving the immune mechanism, and Systemic lupus erythematosus, unspecified (08/15/2022).    Surgical History  She has a past surgical history that includes Other surgical history (08/15/2022); Other surgical history (08/15/2022); Other surgical history (08/15/2022); Other surgical history (2022); Other surgical history (2022);  section, classic; Knee surgery; Appendectomy (); Cholecystectomy ();  section, low transverse (); Hysterectomy (1999); and [place] tooth extraction ().     Social History  She reports that she quit smoking about 7 years ago. Her smoking use included cigarettes. She started smoking about 41 years ago. She has a 17.1 pack-year smoking history. She has never used  smokeless tobacco. She reports that she does not currently use alcohol. She reports that she does not use drugs.    Family History  Family History   Problem Relation Name Age of Onset   • Hyperlipidemia Mother     • Hypertension Mother     • Epilepsy Father     • Dementia Father     • Hypertension Father     • Hyperlipidemia Father     • Hodgkin's lymphoma Sister     • Multiple sclerosis Father's Sister Dianne    • Breast cancer Father's Sister Dianne    • Leukemia Father's Sister Dianne    • Diabetes Maternal Grandmother Betzy raman    • Other (absence seizure) Son         Medications  Current Outpatient Medications   Medication Instructions   • albuterol (ProAir HFA) 90 mcg/actuation inhaler 2 puffs, inhalation, Every 6 hours PRN   • aspirin 81 mg chewable tablet Chew.   • buPROPion XL (Wellbutrin XL) 300 mg 24 hr tablet TAKE ONE TABLET BY MOUTH EVERY 24 HOURS   • hydroxychloroquine (Plaquenil) 200 mg tablet 1 tablet, 2 times daily   • multivitamin (Multiple Vitamins) tablet Take by mouth.   • vitamin D3-vitamin K2, MK4, (K2 Plus D3) 1,000-100 unit-mcg tablet 2 times daily       Allergies  Latex, natural rubber; Morphine; Prednisone; Cortisone; Sulfamethoxazole; and Robaxin [methocarbamol]    Review of Systems   Constitutional:  Positive for fatigue and unexpected weight change. Negative for fever.   HENT:  Positive for sore throat and trouble swallowing.    Eyes:  Negative for visual disturbance.   Respiratory:  Positive for shortness of breath.    Cardiovascular:  Negative for chest pain and palpitations.   Gastrointestinal:  Positive for nausea. Negative for abdominal pain, constipation, diarrhea and vomiting.   Endocrine: Negative for cold intolerance and heat intolerance.   Musculoskeletal:  Positive for arthralgias, back pain and myalgias. Negative for neck pain.   Skin:  Negative for rash.   Neurological:  Negative for tremors, weakness and headaches.   Psychiatric/Behavioral:  Positive for dysphoric mood. The  patient is nervous/anxious.         Memory loss         Last Recorded Vitals  Blood pressure 129/83, pulse 85, weight 92.1 kg (203 lb).    Physical Exam  Constitutional:       General: She is not in acute distress.  Neurological:      Mental Status: She is alert.   Psychiatric:         Mood and Affect: Affect normal.          Relevant Results   Latest Reference Range & Units 10/01/24 16:00   GLUCOSE 74 - 99 mg/dL 94   SODIUM 136 - 145 mmol/L 142   POTASSIUM 3.5 - 5.3 mmol/L 4.3   CHLORIDE 98 - 107 mmol/L 106   Bicarbonate 21 - 32 mmol/L 25   Anion Gap 10 - 20 mmol/L 15   Blood Urea Nitrogen 6 - 23 mg/dL 20   Creatinine 0.50 - 1.05 mg/dL 1.16 (H)   EGFR >60 mL/min/1.73m*2 55 (L)   Calcium 8.6 - 10.6 mg/dL 9.9   Parathyroid Hormone, Intact 18.5 - 88.0 pg/mL 113.2 (H)   Vitamin D, 25-Hydroxy, Total 30 - 100 ng/mL 53         IMPRESSION  HYPERPARATHYROIDISM  Persistent hyperparathyroidism despite normal calcium, vitamin D stores and renal function  Last creatinine minimally elevated      RECOMMENDATIONS  Repeat creatinine with labs for PURVI Gibson.    Follow up 1 year.

## 2024-10-21 NOTE — PROGRESS NOTES
History Of Present Illness  Eugenie Jung is a 59 y.o. female with hyperparathyroidism     Vitamin D3 1000 units with K2, BID     History of fractures in her 20s, left distal ulna, left ankle     DEXA scan 3/26/24  Osteopenia  No history of kidney stone     Treated for SLE  Hydroxychloroquine    Past Medical History  She has a past medical history of Antiphospholipid antibody syndrome (Multi), Anxiety, GERD (gastroesophageal reflux disease) (All the time), Headache (Sporadic), Personal history of diseases of the blood and blood-forming organs and certain disorders involving the immune mechanism, and Systemic lupus erythematosus, unspecified (08/15/2022).    Surgical History  She has a past surgical history that includes Other surgical history (08/15/2022); Other surgical history (08/15/2022); Other surgical history (08/15/2022); Other surgical history (2022); Other surgical history (2022);  section, classic; Knee surgery; Appendectomy (); Cholecystectomy ();  section, low transverse (); Hysterectomy (1999); and [place] tooth extraction ().     Social History  She reports that she quit smoking about 7 years ago. Her smoking use included cigarettes. She started smoking about 41 years ago. She has a 17.1 pack-year smoking history. She has never used smokeless tobacco. She reports that she does not currently use alcohol. She reports that she does not use drugs.    Family History  Family History   Problem Relation Name Age of Onset    Hyperlipidemia Mother      Hypertension Mother      Epilepsy Father      Dementia Father      Hypertension Father      Hyperlipidemia Father      Hodgkin's lymphoma Sister      Multiple sclerosis Father's Sister Dianne     Breast cancer Father's Sister Dianne     Leukemia Father's Sister Dianne     Diabetes Maternal Grandmother Betzy raman     Other (absence seizure) Son         Medications  Current Outpatient Medications   Medication  Instructions    albuterol (ProAir HFA) 90 mcg/actuation inhaler 2 puffs, inhalation, Every 6 hours PRN    aspirin 81 mg chewable tablet Chew.    buPROPion XL (Wellbutrin XL) 300 mg 24 hr tablet TAKE ONE TABLET BY MOUTH EVERY 24 HOURS    hydroxychloroquine (Plaquenil) 200 mg tablet 1 tablet, 2 times daily    multivitamin (Multiple Vitamins) tablet Take by mouth.    vitamin D3-vitamin K2, MK4, (K2 Plus D3) 1,000-100 unit-mcg tablet 2 times daily       Allergies  Latex, natural rubber; Morphine; Prednisone; Cortisone; Sulfamethoxazole; and Robaxin [methocarbamol]    Review of Systems   Constitutional:  Positive for fatigue and unexpected weight change. Negative for fever.   HENT:  Positive for sore throat and trouble swallowing.    Eyes:  Negative for visual disturbance.   Respiratory:  Positive for shortness of breath.    Cardiovascular:  Negative for chest pain and palpitations.   Gastrointestinal:  Positive for nausea. Negative for abdominal pain, constipation, diarrhea and vomiting.   Endocrine: Negative for cold intolerance and heat intolerance.   Musculoskeletal:  Positive for arthralgias, back pain and myalgias. Negative for neck pain.   Skin:  Negative for rash.   Neurological:  Negative for tremors, weakness and headaches.   Psychiatric/Behavioral:  Positive for dysphoric mood. The patient is nervous/anxious.         Memory loss         Last Recorded Vitals  Blood pressure 129/83, pulse 85, weight 92.1 kg (203 lb).    Physical Exam  Constitutional:       General: She is not in acute distress.  Neurological:      Mental Status: She is alert.   Psychiatric:         Mood and Affect: Affect normal.          Relevant Results   Latest Reference Range & Units 10/01/24 16:00   GLUCOSE 74 - 99 mg/dL 94   SODIUM 136 - 145 mmol/L 142   POTASSIUM 3.5 - 5.3 mmol/L 4.3   CHLORIDE 98 - 107 mmol/L 106   Bicarbonate 21 - 32 mmol/L 25   Anion Gap 10 - 20 mmol/L 15   Blood Urea Nitrogen 6 - 23 mg/dL 20   Creatinine 0.50 - 1.05  mg/dL 1.16 (H)   EGFR >60 mL/min/1.73m*2 55 (L)   Calcium 8.6 - 10.6 mg/dL 9.9   Parathyroid Hormone, Intact 18.5 - 88.0 pg/mL 113.2 (H)   Vitamin D, 25-Hydroxy, Total 30 - 100 ng/mL 53         IMPRESSION  HYPERPARATHYROIDISM  Persistent hyperparathyroidism despite normal calcium, vitamin D stores and renal function  Last creatinine minimally elevated      RECOMMENDATIONS  Repeat creatinine with labs for PURVI Gibson.    Follow up 1 year.

## 2024-10-22 ASSESSMENT — ENCOUNTER SYMPTOMS
NERVOUS/ANXIOUS: 1
DYSPHORIC MOOD: 1
NECK PAIN: 0
ARTHRALGIAS: 1
BACK PAIN: 1
FEVER: 0
TROUBLE SWALLOWING: 1
PALPITATIONS: 0
VOMITING: 0
DIARRHEA: 0
SHORTNESS OF BREATH: 1
CONSTIPATION: 0
ABDOMINAL PAIN: 0
UNEXPECTED WEIGHT CHANGE: 1
FATIGUE: 1
SORE THROAT: 1
NAUSEA: 1
TREMORS: 0
MYALGIAS: 1
WEAKNESS: 0
HEADACHES: 0

## 2025-03-30 DIAGNOSIS — F32.A DEPRESSION, UNSPECIFIED DEPRESSION TYPE: ICD-10-CM

## 2025-03-30 DIAGNOSIS — F41.9 ANXIETY: ICD-10-CM

## 2025-03-31 RX ORDER — BUPROPION HYDROCHLORIDE 300 MG/1
TABLET ORAL
Qty: 90 TABLET | Refills: 0 | Status: SHIPPED | OUTPATIENT
Start: 2025-03-31

## 2025-06-09 ENCOUNTER — HOSPITAL ENCOUNTER (OUTPATIENT)
Dept: RADIOLOGY | Facility: HOSPITAL | Age: 60
Discharge: HOME | End: 2025-06-09
Payer: COMMERCIAL

## 2025-06-09 ENCOUNTER — OFFICE VISIT (OUTPATIENT)
Dept: PRIMARY CARE | Facility: CLINIC | Age: 60
End: 2025-06-09
Payer: COMMERCIAL

## 2025-06-09 VITALS
BODY MASS INDEX: 33.28 KG/M2 | DIASTOLIC BLOOD PRESSURE: 94 MMHG | HEART RATE: 86 BPM | WEIGHT: 200 LBS | SYSTOLIC BLOOD PRESSURE: 132 MMHG | OXYGEN SATURATION: 96 % | TEMPERATURE: 98.2 F

## 2025-06-09 DIAGNOSIS — R39.9 UTI SYMPTOMS: ICD-10-CM

## 2025-06-09 DIAGNOSIS — F41.9 ANXIETY: ICD-10-CM

## 2025-06-09 DIAGNOSIS — K21.9 GASTROESOPHAGEAL REFLUX DISEASE WITHOUT ESOPHAGITIS: Primary | ICD-10-CM

## 2025-06-09 DIAGNOSIS — R10.9 ACUTE FLANK PAIN: ICD-10-CM

## 2025-06-09 DIAGNOSIS — Z12.31 VISIT FOR SCREENING MAMMOGRAM: ICD-10-CM

## 2025-06-09 DIAGNOSIS — F32.A DEPRESSION, UNSPECIFIED DEPRESSION TYPE: ICD-10-CM

## 2025-06-09 DIAGNOSIS — R82.90 ABNORMAL URINALYSIS: ICD-10-CM

## 2025-06-09 LAB
POC APPEARANCE, URINE: CLEAR
POC BILIRUBIN, URINE: NEGATIVE
POC BLOOD, URINE: ABNORMAL
POC COLOR, URINE: YELLOW
POC GLUCOSE, URINE: NEGATIVE MG/DL
POC KETONES, URINE: NEGATIVE MG/DL
POC LEUKOCYTES, URINE: NEGATIVE
POC NITRITE,URINE: NEGATIVE
POC PH, URINE: 5.5 PH
POC PROTEIN, URINE: NEGATIVE MG/DL
POC SPECIFIC GRAVITY, URINE: >=1.03
POC UROBILINOGEN, URINE: 0.2 EU/DL

## 2025-06-09 PROCEDURE — 81003 URINALYSIS AUTO W/O SCOPE: CPT | Performed by: PHYSICIAN ASSISTANT

## 2025-06-09 PROCEDURE — 74176 CT ABD & PELVIS W/O CONTRAST: CPT

## 2025-06-09 PROCEDURE — 74176 CT ABD & PELVIS W/O CONTRAST: CPT | Performed by: RADIOLOGY

## 2025-06-09 PROCEDURE — 99214 OFFICE O/P EST MOD 30 MIN: CPT | Performed by: PHYSICIAN ASSISTANT

## 2025-06-09 RX ORDER — BUPROPION HYDROCHLORIDE 300 MG/1
TABLET ORAL
Qty: 90 TABLET | Refills: 0 | Status: SHIPPED | OUTPATIENT
Start: 2025-06-09

## 2025-06-09 RX ORDER — TOFACITINIB 11 MG/1
11 TABLET, FILM COATED, EXTENDED RELEASE ORAL DAILY
COMMUNITY
Start: 2025-05-29

## 2025-06-09 ASSESSMENT — PAIN SCALES - GENERAL: PAINLEVEL_OUTOF10: 4

## 2025-06-09 NOTE — PROGRESS NOTES
Subjective   Patient ID: Eugenie Jung is a 59 y.o. female who presents for UTI (Burning and frequency since 6/5. ).    UTI   This is a new problem. The current episode started in the past 7 days. The problem occurs intermittently. The problem has been waxing and waning. The pain is mild. There has been no fever. Associated symptoms include flank pain, frequency and nausea. Pertinent negatives include no chills, hematuria, urgency or vomiting.        Review of Systems   Constitutional:  Negative for chills.   Gastrointestinal:  Positive for nausea. Negative for vomiting.   Genitourinary:  Positive for flank pain and frequency. Negative for hematuria and urgency.       Objective   BP (!) 132/94   Pulse 86   Temp 36.8 °C (98.2 °F)   Wt 90.7 kg (200 lb)   SpO2 96%   BMI 33.28 kg/m²     Physical Exam  Cardiovascular:      Rate and Rhythm: Normal rate and regular rhythm.      Pulses: Normal pulses.   Pulmonary:      Effort: Pulmonary effort is normal.   Abdominal:      General: Abdomen is flat.      Tenderness: There is abdominal tenderness. There is right CVA tenderness and left CVA tenderness. There is no guarding or rebound.   Neurological:      General: No focal deficit present.      Mental Status: She is oriented to person, place, and time. Mental status is at baseline.   Psychiatric:         Mood and Affect: Mood normal.         Behavior: Behavior normal.         Thought Content: Thought content normal.         Assessment/Plan   Diagnoses and all orders for this visit:  Gastroesophageal reflux disease without esophagitis  -     Referral to Gastroenterology; Future  UTI symptoms  -     POCT UA Automated manually resulted  Abnormal urinalysis  -     Urine Culture  Visit for screening mammogram  -     BI mammo bilateral screening tomosynthesis; Future  Anxiety  -     buPROPion XL (Wellbutrin XL) 300 mg 24 hr tablet; TAKE ONE TABLET BY MOUTH EVERY 24 HOURS  Depression, unspecified depression type  -      buPROPion XL (Wellbutrin XL) 300 mg 24 hr tablet; TAKE ONE TABLET BY MOUTH EVERY 24 HOURS  Acute flank pain  -     CT abdomen pelvis wo IV contrast; Future    Patient also states she is having some reflux issues on a regular basis and has not had an EGD.  Will refer to GI.  Obtain a CT scan to rule out kidney stone especially with microscopic hematuria and flank pain.  Will notify patient results when available.

## 2025-06-10 ASSESSMENT — ENCOUNTER SYMPTOMS
HEMATURIA: 0
CHILLS: 0
FREQUENCY: 1
NAUSEA: 1
VOMITING: 0
FLANK PAIN: 1

## 2025-06-11 LAB — BACTERIA UR CULT: NORMAL

## 2025-09-10 ENCOUNTER — APPOINTMENT (OUTPATIENT)
Dept: PRIMARY CARE | Facility: CLINIC | Age: 60
End: 2025-09-10
Payer: COMMERCIAL

## 2025-10-27 ENCOUNTER — APPOINTMENT (OUTPATIENT)
Dept: ENDOCRINOLOGY | Facility: CLINIC | Age: 60
End: 2025-10-27
Payer: COMMERCIAL